# Patient Record
Sex: FEMALE | Race: WHITE | Employment: FULL TIME | URBAN - METROPOLITAN AREA
[De-identification: names, ages, dates, MRNs, and addresses within clinical notes are randomized per-mention and may not be internally consistent; named-entity substitution may affect disease eponyms.]

---

## 2017-04-27 ENCOUNTER — HOSPITAL ENCOUNTER (OUTPATIENT)
Dept: MAMMOGRAPHY | Age: 42
Discharge: HOME | End: 2017-04-27
Attending: OBSTETRICS & GYNECOLOGY
Payer: COMMERCIAL

## 2017-04-27 DIAGNOSIS — Z12.31 VISIT FOR SCREENING MAMMOGRAM: ICD-10-CM

## 2017-04-27 PROCEDURE — 77067 SCR MAMMO BI INCL CAD: CPT

## 2018-06-07 ENCOUNTER — HOSPITAL ENCOUNTER (OUTPATIENT)
Dept: MAMMOGRAPHY | Age: 43
Discharge: HOME | End: 2018-06-07
Attending: OBSTETRICS & GYNECOLOGY
Payer: COMMERCIAL

## 2018-06-07 DIAGNOSIS — Z01.419 WELL WOMAN EXAM: ICD-10-CM

## 2018-06-07 DIAGNOSIS — Z12.39 SCREENING BREAST EXAMINATION: ICD-10-CM

## 2018-06-07 PROCEDURE — 77067 SCR MAMMO BI INCL CAD: CPT

## 2018-10-10 PROBLEM — Z13.0 SCREENING FOR IRON DEFICIENCY ANEMIA: Status: ACTIVE | Noted: 2018-10-10

## 2018-10-10 PROBLEM — N92.1 MENOMETRORRHAGIA: Status: ACTIVE | Noted: 2018-10-10

## 2018-10-15 ENCOUNTER — HOSPITAL ENCOUNTER (OUTPATIENT)
Dept: SURGERY | Age: 43
Discharge: HOME | End: 2018-10-15

## 2018-10-15 VITALS — HEIGHT: 64 IN | BODY MASS INDEX: 24.59 KG/M2 | WEIGHT: 144 LBS

## 2018-10-15 NOTE — PERIOP NOTES
Patient verified name and . Order for consent not found in EHR- unable to determine if it matches case posting; patient verifies procedure. Type 1B surgery, phone assessment complete. Orders not received. Labs per surgeon: no orders in EMR at this time  Labs per anesthesia protocol: hgb collected 10/9/18- result in EMR and within anesthesia guidelines    Patient answered medical/surgical history questions at their best of ability. All prior to admission medications documented in [facility]. Patient instructed to take the following medications the day of surgery according to anesthesia guidelines with a small sip of water: none. Hold all vitamins 7 days prior to surgery and NSAIDS 5 days prior to surgery. Medications to be held- vitamins (except Rx iron). Patient instructed on the following:  Arrive at A Entrance, time of arrival to be called the day before by 1700  NPO after midnight including gum, mints, and ice chips  Responsible adult must drive patient to the hospital, stay during surgery, and patient will need supervision 24 hours after anesthesia  Use antibacterial soap in shower the night before surgery and on the morning of surgery  Leave all valuables (money and jewelry) at home but bring insurance card and ID on DOS  Do not wear make-up, nail polish, lotions, cologne, perfumes, powders, or oil on skin. Patient teach back successful and patient demonstrates knowledge of instruction.

## 2018-10-17 PROBLEM — Z01.818 PREOP EXAMINATION: Status: ACTIVE | Noted: 2018-10-17

## 2018-10-17 RX ORDER — SODIUM CHLORIDE 0.9 % (FLUSH) 0.9 %
5-10 SYRINGE (ML) INJECTION AS NEEDED
Status: CANCELLED | OUTPATIENT
Start: 2018-10-17

## 2018-10-17 RX ORDER — SODIUM CHLORIDE 0.9 % (FLUSH) 0.9 %
5-10 SYRINGE (ML) INJECTION EVERY 8 HOURS
Status: CANCELLED | OUTPATIENT
Start: 2018-10-17

## 2018-10-17 NOTE — H&P
History and Physical      Geno Gardner:   Physician:  Rosie Aguilar. Alt, DO    This 37 y.o. female presents today for pre-operative evaluation. History: This 37 y.o.  patient has history of  AUB, Endo hyperplasia, anemia. Unable to do endo biopsy in office. Us suggestive of hyperplasia. Previous treatment includes: iron and aygestin. OB History      Para Term  AB Living    3 2     1      SAB TAB Ectopic Molar Multiple Live Births                         Past Medical History:   Diagnosis Date    Anxiety     Heart palpitations     pt states possibly r/t to anxiety and anemia    Menorrhagia     Rib fracture 2014    Seasonal allergic reaction         has a past surgical history that includes hx hernia repair (); hx breast biopsy (Right); hx wisdom teeth extraction; and HERNIA VENTRAL REPAIR WITH MESH (N/A, 2014). Current Outpatient Medications   Medication Sig Dispense    miSOPROStol (CYTOTEC) 200 mcg tablet 1 tab orally night before surgery and 1 tab vaginally 2 Tab    iron-folate em5-P-T83-zinc-dss (FERIVA 21-7 TABLET) 75 mg iron- 1 mg-175 mg tab Take 1 Tab by mouth daily. Indications: Vitamin Deficiency 30 Tab    BIOTIN, BULK, by Does Not Apply route.  multivitamin (ONE A DAY) tablet Take 1 tablet by mouth daily.  norethindrone acetate (AYGESTIN) 5 mg tablet Take 2 Tabs by mouth daily. 60 Tab    tranexamic acid (LYSTEDA) 650 mg tab tablet Take 2 Tabs by mouth three (3) times daily. (Patient not taking: Reported on 10/15/2018) 30 Tab     No current facility-administered medications for this visit. No Known Allergies. reports that she quit smoking about 13 years ago. She quit after 14.00 years of use. she has never used smokeless tobacco. She reports that she drinks about 3.6 oz of alcohol per week. She reports that she does not use drugs.     family history includes Alcohol abuse in her father; Breast Cancer (age of onset: [de-identified]) in her paternal aunt; Diabetes in her paternal grandmother; Heart Disease in her paternal grandmother; Hypertension in her paternal grandmother; Kidney Disease in her father. Physical Exam:    Vitals:    10/17/18 1619   BP: 114/72   Weight: 142 lb (64.4 kg)   Height: 5' 4\" (1.626 m)       Patient without distress. Heart: Regular rate and rhythm   Lung: clear to auscultation throughout lung fields, no wheezes, no rales, no rhonchi and normal respiratory effort  Abdomen: soft, nontender  Lower Extremities:  - Edema No    Findings/Diagnosis:   Pre-Op Evaluation done today. abnormal uterine bleeding and acute blood loss anemia     Surgery to be Performed:  hysteroscopy with D&C  Surgery Date:  10/19/18 @ 11:00am  Surgery Place:  [facility]  Surgery Duration:  0.5 hour  Surgery Type:  Assistant Surgeon:  n/a    The risks of surgery were discussed in detail, including anesthesia, hemorrhage, blood clots, infection, uterine perforation and damage to other organs such as bowel. Time away from work and physical restrictions discussed. Would like her to use vaginal and oral cytotec to soften cervix. Pt understands & states she wants to proceed w/surgery.

## 2018-10-18 ENCOUNTER — ANESTHESIA EVENT (OUTPATIENT)
Dept: SURGERY | Age: 43
End: 2018-10-18
Payer: COMMERCIAL

## 2018-10-18 RX ORDER — SODIUM CHLORIDE 0.9 % (FLUSH) 0.9 %
5-10 SYRINGE (ML) INJECTION EVERY 8 HOURS
Status: CANCELLED | OUTPATIENT
Start: 2018-10-18

## 2018-10-18 RX ORDER — SODIUM CHLORIDE 9 MG/ML
25 INJECTION, SOLUTION INTRAVENOUS CONTINUOUS
Status: CANCELLED | OUTPATIENT
Start: 2018-10-18 | End: 2018-10-19

## 2018-10-18 RX ORDER — SODIUM CHLORIDE 0.9 % (FLUSH) 0.9 %
5-10 SYRINGE (ML) INJECTION AS NEEDED
Status: CANCELLED | OUTPATIENT
Start: 2018-10-18

## 2018-10-19 ENCOUNTER — ANESTHESIA (OUTPATIENT)
Dept: SURGERY | Age: 43
End: 2018-10-19
Payer: COMMERCIAL

## 2018-10-19 ENCOUNTER — HOSPITAL ENCOUNTER (OUTPATIENT)
Age: 43
Setting detail: OUTPATIENT SURGERY
Discharge: HOME | End: 2018-10-19
Attending: OBSTETRICS & GYNECOLOGY | Admitting: OBSTETRICS & GYNECOLOGY
Payer: COMMERCIAL

## 2018-10-19 VITALS
WEIGHT: 143.38 LBS | HEART RATE: 58 BPM | DIASTOLIC BLOOD PRESSURE: 59 MMHG | OXYGEN SATURATION: 96 % | TEMPERATURE: 99.1 F | BODY MASS INDEX: 24.48 KG/M2 | HEIGHT: 64 IN | SYSTOLIC BLOOD PRESSURE: 101 MMHG | RESPIRATION RATE: 18 BRPM

## 2018-10-19 DIAGNOSIS — Z98.890 S/P DILATION AND CURETTAGE: Primary | ICD-10-CM

## 2018-10-19 LAB — HCG UR QL: NEGATIVE

## 2018-10-19 PROCEDURE — 74011250636 HC RX REV CODE- 250/636

## 2018-10-19 PROCEDURE — 76060000032 HC ANESTHESIA 0.5 TO 1 HR: Performed by: OBSTETRICS & GYNECOLOGY

## 2018-10-19 PROCEDURE — 88305 TISSUE EXAM BY PATHOLOGIST: CPT

## 2018-10-19 PROCEDURE — 77030018836 HC SOL IRR NACL ICUM -A: Performed by: OBSTETRICS & GYNECOLOGY

## 2018-10-19 PROCEDURE — 77030020782 HC GWN BAIR PAWS FLX 3M -B: Performed by: ANESTHESIOLOGY

## 2018-10-19 PROCEDURE — 77030020143 HC AIRWY LARYN INTUB CGAS -A: Performed by: ANESTHESIOLOGY

## 2018-10-19 PROCEDURE — 76210000006 HC OR PH I REC 0.5 TO 1 HR: Performed by: OBSTETRICS & GYNECOLOGY

## 2018-10-19 PROCEDURE — 74011000250 HC RX REV CODE- 250

## 2018-10-19 PROCEDURE — 81025 URINE PREGNANCY TEST: CPT

## 2018-10-19 PROCEDURE — 77030005537 HC CATH URETH BARD -A: Performed by: OBSTETRICS & GYNECOLOGY

## 2018-10-19 PROCEDURE — 74011250637 HC RX REV CODE- 250/637: Performed by: ANESTHESIOLOGY

## 2018-10-19 PROCEDURE — 77030019927 HC TBNG IRR CYSTO BAXT -A: Performed by: OBSTETRICS & GYNECOLOGY

## 2018-10-19 PROCEDURE — 74011250636 HC RX REV CODE- 250/636: Performed by: ANESTHESIOLOGY

## 2018-10-19 PROCEDURE — 76010000138 HC OR TIME 0.5 TO 1 HR: Performed by: OBSTETRICS & GYNECOLOGY

## 2018-10-19 PROCEDURE — 76210000020 HC REC RM PH II FIRST 0.5 HR: Performed by: OBSTETRICS & GYNECOLOGY

## 2018-10-19 RX ORDER — HYDROMORPHONE HYDROCHLORIDE 2 MG/ML
0.5 INJECTION, SOLUTION INTRAMUSCULAR; INTRAVENOUS; SUBCUTANEOUS
Status: DISCONTINUED | OUTPATIENT
Start: 2018-10-19 | End: 2018-10-19 | Stop reason: HOSPADM

## 2018-10-19 RX ORDER — FENTANYL CITRATE 50 UG/ML
100 INJECTION, SOLUTION INTRAMUSCULAR; INTRAVENOUS ONCE
Status: DISCONTINUED | OUTPATIENT
Start: 2018-10-19 | End: 2018-10-19 | Stop reason: HOSPADM

## 2018-10-19 RX ORDER — IBUPROFEN 800 MG/1
800 TABLET ORAL
Qty: 35 TAB | Refills: 1 | Status: SHIPPED | OUTPATIENT
Start: 2018-10-19 | End: 2018-10-31 | Stop reason: ALTCHOICE

## 2018-10-19 RX ORDER — KETOROLAC TROMETHAMINE 30 MG/ML
INJECTION, SOLUTION INTRAMUSCULAR; INTRAVENOUS AS NEEDED
Status: DISCONTINUED | OUTPATIENT
Start: 2018-10-19 | End: 2018-10-19 | Stop reason: HOSPADM

## 2018-10-19 RX ORDER — MIDAZOLAM HYDROCHLORIDE 1 MG/ML
2 INJECTION, SOLUTION INTRAMUSCULAR; INTRAVENOUS
Status: DISCONTINUED | OUTPATIENT
Start: 2018-10-19 | End: 2018-10-19 | Stop reason: SDUPTHER

## 2018-10-19 RX ORDER — DEXAMETHASONE SODIUM PHOSPHATE 4 MG/ML
INJECTION, SOLUTION INTRA-ARTICULAR; INTRALESIONAL; INTRAMUSCULAR; INTRAVENOUS; SOFT TISSUE AS NEEDED
Status: DISCONTINUED | OUTPATIENT
Start: 2018-10-19 | End: 2018-10-19 | Stop reason: HOSPADM

## 2018-10-19 RX ORDER — SODIUM CHLORIDE 0.9 % (FLUSH) 0.9 %
5-10 SYRINGE (ML) INJECTION AS NEEDED
Status: DISCONTINUED | OUTPATIENT
Start: 2018-10-19 | End: 2018-10-19 | Stop reason: HOSPADM

## 2018-10-19 RX ORDER — SODIUM CHLORIDE, SODIUM LACTATE, POTASSIUM CHLORIDE, CALCIUM CHLORIDE 600; 310; 30; 20 MG/100ML; MG/100ML; MG/100ML; MG/100ML
100 INJECTION, SOLUTION INTRAVENOUS CONTINUOUS
Status: DISCONTINUED | OUTPATIENT
Start: 2018-10-19 | End: 2018-10-19 | Stop reason: HOSPADM

## 2018-10-19 RX ORDER — LIDOCAINE HYDROCHLORIDE 10 MG/ML
0.1 INJECTION INFILTRATION; PERINEURAL AS NEEDED
Status: DISCONTINUED | OUTPATIENT
Start: 2018-10-19 | End: 2018-10-19 | Stop reason: HOSPADM

## 2018-10-19 RX ORDER — HYDROCODONE BITARTRATE AND ACETAMINOPHEN 7.5; 325 MG/1; MG/1
1 TABLET ORAL AS NEEDED
Status: DISCONTINUED | OUTPATIENT
Start: 2018-10-19 | End: 2018-10-19 | Stop reason: HOSPADM

## 2018-10-19 RX ORDER — HYDROCODONE BITARTRATE AND ACETAMINOPHEN 5; 325 MG/1; MG/1
1 TABLET ORAL
Qty: 10 TAB | Refills: 0 | Status: SHIPPED | OUTPATIENT
Start: 2018-10-19 | End: 2018-10-31 | Stop reason: ALTCHOICE

## 2018-10-19 RX ORDER — PROPOFOL 10 MG/ML
INJECTION, EMULSION INTRAVENOUS AS NEEDED
Status: DISCONTINUED | OUTPATIENT
Start: 2018-10-19 | End: 2018-10-19 | Stop reason: HOSPADM

## 2018-10-19 RX ORDER — NALOXONE HYDROCHLORIDE 0.4 MG/ML
0.1 INJECTION, SOLUTION INTRAMUSCULAR; INTRAVENOUS; SUBCUTANEOUS AS NEEDED
Status: DISCONTINUED | OUTPATIENT
Start: 2018-10-19 | End: 2018-10-19 | Stop reason: HOSPADM

## 2018-10-19 RX ORDER — EPHEDRINE SULFATE 50 MG/ML
INJECTION, SOLUTION INTRAVENOUS AS NEEDED
Status: DISCONTINUED | OUTPATIENT
Start: 2018-10-19 | End: 2018-10-19 | Stop reason: HOSPADM

## 2018-10-19 RX ORDER — ONDANSETRON 2 MG/ML
INJECTION INTRAMUSCULAR; INTRAVENOUS AS NEEDED
Status: DISCONTINUED | OUTPATIENT
Start: 2018-10-19 | End: 2018-10-19 | Stop reason: HOSPADM

## 2018-10-19 RX ORDER — MIDAZOLAM HYDROCHLORIDE 1 MG/ML
2 INJECTION, SOLUTION INTRAMUSCULAR; INTRAVENOUS
Status: DISCONTINUED | OUTPATIENT
Start: 2018-10-19 | End: 2018-10-19 | Stop reason: HOSPADM

## 2018-10-19 RX ORDER — FENTANYL CITRATE 50 UG/ML
INJECTION, SOLUTION INTRAMUSCULAR; INTRAVENOUS AS NEEDED
Status: DISCONTINUED | OUTPATIENT
Start: 2018-10-19 | End: 2018-10-19 | Stop reason: HOSPADM

## 2018-10-19 RX ORDER — LIDOCAINE HYDROCHLORIDE 20 MG/ML
INJECTION, SOLUTION EPIDURAL; INFILTRATION; INTRACAUDAL; PERINEURAL AS NEEDED
Status: DISCONTINUED | OUTPATIENT
Start: 2018-10-19 | End: 2018-10-19 | Stop reason: HOSPADM

## 2018-10-19 RX ORDER — OXYCODONE HYDROCHLORIDE 5 MG/1
5 TABLET ORAL
Status: DISCONTINUED | OUTPATIENT
Start: 2018-10-19 | End: 2018-10-19 | Stop reason: HOSPADM

## 2018-10-19 RX ORDER — FAMOTIDINE 20 MG/1
20 TABLET, FILM COATED ORAL ONCE
Status: COMPLETED | OUTPATIENT
Start: 2018-10-19 | End: 2018-10-19

## 2018-10-19 RX ADMIN — EPHEDRINE SULFATE 10 MG: 50 INJECTION, SOLUTION INTRAVENOUS at 11:15

## 2018-10-19 RX ADMIN — KETOROLAC TROMETHAMINE 30 MG: 30 INJECTION, SOLUTION INTRAMUSCULAR; INTRAVENOUS at 11:27

## 2018-10-19 RX ADMIN — LIDOCAINE HYDROCHLORIDE 0.1 ML: 10 INJECTION, SOLUTION INFILTRATION; PERINEURAL at 09:48

## 2018-10-19 RX ADMIN — LIDOCAINE HYDROCHLORIDE 60 MG: 20 INJECTION, SOLUTION EPIDURAL; INFILTRATION; INTRACAUDAL; PERINEURAL at 11:04

## 2018-10-19 RX ADMIN — DEXAMETHASONE SODIUM PHOSPHATE 4 MG: 4 INJECTION, SOLUTION INTRA-ARTICULAR; INTRALESIONAL; INTRAMUSCULAR; INTRAVENOUS; SOFT TISSUE at 11:10

## 2018-10-19 RX ADMIN — FENTANYL CITRATE 100 MCG: 50 INJECTION, SOLUTION INTRAMUSCULAR; INTRAVENOUS at 10:59

## 2018-10-19 RX ADMIN — SODIUM CHLORIDE, SODIUM LACTATE, POTASSIUM CHLORIDE, AND CALCIUM CHLORIDE 100 ML/HR: 600; 310; 30; 20 INJECTION, SOLUTION INTRAVENOUS at 09:48

## 2018-10-19 RX ADMIN — FAMOTIDINE 20 MG: 20 TABLET ORAL at 09:40

## 2018-10-19 RX ADMIN — PROPOFOL 200 MG: 10 INJECTION, EMULSION INTRAVENOUS at 11:04

## 2018-10-19 RX ADMIN — ONDANSETRON 4 MG: 2 INJECTION INTRAMUSCULAR; INTRAVENOUS at 11:13

## 2018-10-19 NOTE — OP NOTES
Hysteroscopy Operative Report    Date of Surgery: 10/19/2018      Preoperative Diagnosis: Abnormal uterine bleeding (AUB) [N93.9]  Endometrial hyperplasia [N85.00]     Postoperative Diagnosis: Abnormal uterine bleeding (AUB) [N93.9]     Surgeon:  Patel Calloway DO    Anesthesia: General     Procedure: Procedure(s):  DILATATION AND CURETTAGE HYSTEROSCOPY     Estimated Blood Loss:     50cc    Intravenous fluids were administered, lactated Ringer's 850 ml's. Specimen:   ID Type Source Tests Collected by Time Destination   1 : endometrial currettage Preservative   Giancarlo Muir DO 10/19/2018 1119 Pathology        Findings:  Anteverted uterus sounded to 11cm with lush polypoid type tissue seen. Procedure Detail:  Patient was taken to the operating room where she was administered geta. Once her anesthesia was found to be adequate, she was then prepped and draped in normal sterile fashion and placed in low rio stirrups. Next heavy weighted speculum was placed in posterior vaginal vault and a right angle retractor was used to visualize the cervix. All instruments were removed for the vagina and bimanual exam indicated an anteverted uterus. Heavy weighted speculum replaced. The anterior lip of the cervix was grasped with single-toothed tenaculum. Next uterus was dilated to accomodate a 5mm hysteroscope. Next hysteroscope was advanced to fundus with findings as noted above. Polyp forceps and sharp currettage was then performed of the cavity. Hysteroscope advanced two more times with currettage performed  with thin shaggy endometrium  noted. At this point the procedure was over and single toothed tenaculum was removed from anterior lip of the cervix with excellent hemostasis. Patient tolerated the procedure well.      Signed By: Coral Jimenez DO     October 19, 2018

## 2018-10-19 NOTE — ANESTHESIA PREPROCEDURE EVALUATION
Anesthetic History               Review of Systems / Medical History  Patient summary reviewed    Pulmonary                   Neuro/Psych              Cardiovascular            Dysrhythmias (palpitations)       Exercise tolerance: >4 METS     GI/Hepatic/Renal                Endo/Other             Other Findings              Physical Exam    Airway  Mallampati: II  TM Distance: > 6 cm  Neck ROM: normal range of motion   Mouth opening: Normal     Cardiovascular  Regular rate and rhythm,  S1 and S2 normal,  no murmur, click, rub, or gallop             Dental  No notable dental hx       Pulmonary  Breath sounds clear to auscultation               Abdominal         Other Findings            Anesthetic Plan    ASA: 2  Anesthesia type: general            Anesthetic plan and risks discussed with: Patient

## 2018-10-31 PROBLEM — Z09 POSTOP CHECK: Status: ACTIVE | Noted: 2018-10-31

## 2018-12-18 NOTE — H&P
History and Physical      Jose Luis Sender:   Physician:  Malini Kidd. Alt, DO    This 37 y.o. female presents today for pre-operative evaluation. History: This 37 y.o.  patient has history of  Complex endometrial hyperplasia with atypia. Last hg was 10.3 in October. She is taking oral iron therapy. OB History      Para Term  AB Living    3 2     1 1    SAB TAB Ectopic Molar Multiple Live Births              2          Past Medical History:   Diagnosis Date    Anxiety     Heart palpitations     pt states possibly r/t to anxiety and anemia    Menorrhagia     Rib fracture 2014    Seasonal allergic reaction         has a past surgical history that includes hx hernia repair (); hx breast biopsy (Right); hx wisdom teeth extraction; hx dilation and curettage (10/19/2018); DILATATION AND CURETTAGE HYSTEROSCOPY (N/A, 10/19/2018); and HERNIA VENTRAL REPAIR WITH MESH (N/A, 2014). Current Outpatient Medications   Medication Sig Dispense    biotin 1 mg tab Take  by mouth.  iron-folate gn4-O-A24-zinc-dss (FERIVA 21-7 TABLET) 75 mg iron- 1 mg-175 mg tab Take 1 Tab by mouth daily. Indications: Vitamin Deficiency 30 Tab    multivitamin (ONE A DAY) tablet Take 1 tablet by mouth daily. No current facility-administered medications for this visit. No Known Allergies. reports that she quit smoking about 14 years ago. She quit after 14.00 years of use. she has never used smokeless tobacco. She reports that she drinks about 3.6 oz of alcohol per week. She reports that she does not use drugs. family history includes Alcohol abuse in her father; Breast Cancer (age of onset: [de-identified]) in her paternal aunt; Diabetes in her paternal grandmother; Heart Disease in her paternal grandmother; Hypertension in her paternal grandmother; Kidney Disease in her father.       Physical Exam:    Vitals:    18 0846   BP: 114/68   Weight: 148 lb (67.1 kg)       Patient without distress. Heart: Regular rate and rhythm   Lung: clear to auscultation throughout lung fields, no wheezes, no rales, no rhonchi and normal respiratory effort  Abdomen: soft, nontender  Lower Extremities:  - Edema No    Findings/Diagnosis:   Pre-Op Evaluation done today. Complex atypical hyperplasia      Surgery to be Performed:  tlh with bilateral salpingectomy   Surgery Date:    Surgery Place:  [facility]  Surgery Duration:  1.5 hour  Surgery Type:  Assistant Surgeon: The risks of surgery were discussed in detail, including anesthesia, hemorrhage, blood clots, infection, damage to other organs such as bowel, bladder, ureters. Also the possible need for catheter use at home after surgery. Time away from work and physical restrictions discussed. Also the possible need for catheter use at home after surgery. Pt understands that complications aren't anticipated but that if they should occur than corrective procedures would be performed as indicated including, but not limited to, need for: transfusion, antibiotics, and additional surgical procedures including modification/extension of incision (possible vertical incision), colostomy, reimplantation of ureters, need for prolonged catheter drainage should urologic injury occur & other procedures as indicated. Unanticipated reactions to anesthesia as well as the small possibility of death were all discussed. All of the patient's questions/concerns were answered. She was encouraged to call me if she has additional questions/concerns prior to surgery. Pt understands & states she wants to proceed w/surgery.

## 2018-12-20 ENCOUNTER — HOSPITAL ENCOUNTER (OUTPATIENT)
Dept: SURGERY | Age: 43
Discharge: HOME | End: 2018-12-20
Attending: OBSTETRICS & GYNECOLOGY
Payer: COMMERCIAL

## 2018-12-20 VITALS
SYSTOLIC BLOOD PRESSURE: 105 MMHG | BODY MASS INDEX: 25.51 KG/M2 | OXYGEN SATURATION: 98 % | HEART RATE: 61 BPM | TEMPERATURE: 97 F | HEIGHT: 64 IN | RESPIRATION RATE: 14 BRPM | DIASTOLIC BLOOD PRESSURE: 65 MMHG | WEIGHT: 149.44 LBS

## 2018-12-20 LAB — HGB BLD-MCNC: 11 G/DL (ref 11.7–15.4)

## 2018-12-20 PROCEDURE — 85018 HEMOGLOBIN: CPT

## 2018-12-20 NOTE — PERIOP NOTES
Hgb result within anesthesia guidelines. Chart filed.     Recent Results (from the past 12 hour(s))   HEMOGLOBIN    Collection Time: 12/20/18  3:50 PM   Result Value Ref Range    HGB 11.0 (L) 11.7 - 15.4 g/dL

## 2018-12-20 NOTE — PERIOP NOTES
Patient verified name and     Order for consent found in EHR and matches case posting; patient verified. Type 2 surgery, PAT assessment complete. Labs per surgeon: none  Labs per anesthesia protocol: hgb needed- results pending  EKG: not needed    Hibiclens and instructions given per hospital policy. Patient provided with and instructed on educational handouts including Guide to Surgery, Pain Management, Hand Hygiene, Blood Transfusion Education, and [facility] Brochure. Patient answered medical/surgical history questions at their best of ability. All prior to admission medications documented in [facility]. Original medication prescription bottles not visualized during patient appointment. Patient instructed to hold all vitamins 7 days prior to surgery and NSAIDS 5 days prior to surgery, patient verbalized understanding. Medications to be held: vitamins except iron supplement. Patient instructed to continue previous medications as prescribed prior to surgery and to take the following medications the day of surgery according to anesthesia guidelines with a small sip of water: none. Patient teach back successful and patient demonstrates knowledge of instructions.

## 2019-01-02 ENCOUNTER — ANESTHESIA EVENT (OUTPATIENT)
Dept: SURGERY | Age: 44
End: 2019-01-02
Payer: COMMERCIAL

## 2019-01-03 ENCOUNTER — ANESTHESIA (OUTPATIENT)
Dept: SURGERY | Age: 44
End: 2019-01-03
Payer: COMMERCIAL

## 2019-01-03 ENCOUNTER — HOSPITAL ENCOUNTER (OUTPATIENT)
Age: 44
Setting detail: OBSERVATION
Discharge: HOME | End: 2019-01-04
Attending: OBSTETRICS & GYNECOLOGY | Admitting: OBSTETRICS & GYNECOLOGY
Payer: COMMERCIAL

## 2019-01-03 DIAGNOSIS — Z90.710 S/P HYSTERECTOMY: Primary | ICD-10-CM

## 2019-01-03 PROBLEM — N85.02 COMPLEX ATYPICAL ENDOMETRIAL HYPERPLASIA: Status: ACTIVE | Noted: 2019-01-03

## 2019-01-03 LAB
ABO + RH BLD: NORMAL
BLOOD GROUP ANTIBODIES SERPL: NORMAL
HCG UR QL: NEGATIVE
SPECIMEN EXP DATE BLD: NORMAL

## 2019-01-03 PROCEDURE — 81025 URINE PREGNANCY TEST: CPT

## 2019-01-03 PROCEDURE — 86900 BLOOD TYPING SEROLOGIC ABO: CPT

## 2019-01-03 PROCEDURE — 77030018836 HC SOL IRR NACL ICUM -A: Performed by: OBSTETRICS & GYNECOLOGY

## 2019-01-03 PROCEDURE — 77030035236 HC SUT PDS STRATFX BARB J&J -B: Performed by: OBSTETRICS & GYNECOLOGY

## 2019-01-03 PROCEDURE — 77030008477 HC STYL SATN SLP COVD -A: Performed by: ANESTHESIOLOGY

## 2019-01-03 PROCEDURE — 77030020782 HC GWN BAIR PAWS FLX 3M -B: Performed by: ANESTHESIOLOGY

## 2019-01-03 PROCEDURE — 74011250636 HC RX REV CODE- 250/636: Performed by: OBSTETRICS & GYNECOLOGY

## 2019-01-03 PROCEDURE — 88307 TISSUE EXAM BY PATHOLOGIST: CPT

## 2019-01-03 PROCEDURE — 74011250636 HC RX REV CODE- 250/636

## 2019-01-03 PROCEDURE — 77030035044 HC TRCR ENDOSC VRSPRT BLDLSS COVD -C: Performed by: OBSTETRICS & GYNECOLOGY

## 2019-01-03 PROCEDURE — 77030031139 HC SUT VCRL2 J&J -A: Performed by: OBSTETRICS & GYNECOLOGY

## 2019-01-03 PROCEDURE — 77030035051: Performed by: OBSTETRICS & GYNECOLOGY

## 2019-01-03 PROCEDURE — 77030039425 HC BLD LARYNG TRULITE DISP TELE -A: Performed by: ANESTHESIOLOGY

## 2019-01-03 PROCEDURE — 77030035029 HC NDL INSUF VERES DISP COVD -B: Performed by: OBSTETRICS & GYNECOLOGY

## 2019-01-03 PROCEDURE — 74011250636 HC RX REV CODE- 250/636: Performed by: ANESTHESIOLOGY

## 2019-01-03 PROCEDURE — 94760 N-INVAS EAR/PLS OXIMETRY 1: CPT

## 2019-01-03 PROCEDURE — 74011250637 HC RX REV CODE- 250/637: Performed by: OBSTETRICS & GYNECOLOGY

## 2019-01-03 PROCEDURE — 99218 HC RM OBSERVATION: CPT

## 2019-01-03 PROCEDURE — 74011000250 HC RX REV CODE- 250

## 2019-01-03 PROCEDURE — 76210000016 HC OR PH I REC 1 TO 1.5 HR: Performed by: OBSTETRICS & GYNECOLOGY

## 2019-01-03 PROCEDURE — 77030008703 HC TU ET UNCUF COVD -A: Performed by: ANESTHESIOLOGY

## 2019-01-03 PROCEDURE — 76060000034 HC ANESTHESIA 1.5 TO 2 HR: Performed by: OBSTETRICS & GYNECOLOGY

## 2019-01-03 PROCEDURE — 77030008756 HC TU IRR SUC STRY -B: Performed by: OBSTETRICS & GYNECOLOGY

## 2019-01-03 PROCEDURE — 77030034849: Performed by: OBSTETRICS & GYNECOLOGY

## 2019-01-03 PROCEDURE — 77030035048 HC TRCR ENDOSC OPTCL COVD -B: Performed by: OBSTETRICS & GYNECOLOGY

## 2019-01-03 PROCEDURE — 77030039266 HC ADH SKN EXOFIN S2SG -A: Performed by: OBSTETRICS & GYNECOLOGY

## 2019-01-03 PROCEDURE — 77030018778 HC MANIP UTER VCAR CNMD -B: Performed by: OBSTETRICS & GYNECOLOGY

## 2019-01-03 PROCEDURE — 76010000162 HC OR TIME 1.5 TO 2 HR INTENSV-TIER 1: Performed by: OBSTETRICS & GYNECOLOGY

## 2019-01-03 PROCEDURE — 77030032490 HC SLV COMPR SCD KNE COVD -B: Performed by: OBSTETRICS & GYNECOLOGY

## 2019-01-03 PROCEDURE — 77030008522 HC TBNG INSUF LAPRO STRY -B: Performed by: OBSTETRICS & GYNECOLOGY

## 2019-01-03 PROCEDURE — 77030000038 HC TIP SCIS LAPSCP SURI -B: Performed by: OBSTETRICS & GYNECOLOGY

## 2019-01-03 RX ORDER — SODIUM CHLORIDE, SODIUM LACTATE, POTASSIUM CHLORIDE, CALCIUM CHLORIDE 600; 310; 30; 20 MG/100ML; MG/100ML; MG/100ML; MG/100ML
75 INJECTION, SOLUTION INTRAVENOUS CONTINUOUS
Status: DISCONTINUED | OUTPATIENT
Start: 2019-01-03 | End: 2019-01-03 | Stop reason: HOSPADM

## 2019-01-03 RX ORDER — SODIUM CHLORIDE 0.9 % (FLUSH) 0.9 %
5-10 SYRINGE (ML) INJECTION AS NEEDED
Status: DISCONTINUED | OUTPATIENT
Start: 2019-01-03 | End: 2019-01-03 | Stop reason: HOSPADM

## 2019-01-03 RX ORDER — LIDOCAINE HYDROCHLORIDE 20 MG/ML
INJECTION, SOLUTION EPIDURAL; INFILTRATION; INTRACAUDAL; PERINEURAL AS NEEDED
Status: DISCONTINUED | OUTPATIENT
Start: 2019-01-03 | End: 2019-01-03 | Stop reason: HOSPADM

## 2019-01-03 RX ORDER — SODIUM CHLORIDE 0.9 % (FLUSH) 0.9 %
5-10 SYRINGE (ML) INJECTION AS NEEDED
Status: DISCONTINUED | OUTPATIENT
Start: 2019-01-03 | End: 2019-01-04 | Stop reason: HOSPADM

## 2019-01-03 RX ORDER — HYDROCODONE BITARTRATE AND ACETAMINOPHEN 7.5; 325 MG/1; MG/1
1 TABLET ORAL
Status: DISCONTINUED | OUTPATIENT
Start: 2019-01-03 | End: 2019-01-04 | Stop reason: HOSPADM

## 2019-01-03 RX ORDER — SODIUM CHLORIDE 0.9 % (FLUSH) 0.9 %
5-10 SYRINGE (ML) INJECTION EVERY 8 HOURS
Status: DISCONTINUED | OUTPATIENT
Start: 2019-01-03 | End: 2019-01-04 | Stop reason: HOSPADM

## 2019-01-03 RX ORDER — GLYCOPYRROLATE 0.2 MG/ML
INJECTION INTRAMUSCULAR; INTRAVENOUS AS NEEDED
Status: DISCONTINUED | OUTPATIENT
Start: 2019-01-03 | End: 2019-01-03 | Stop reason: HOSPADM

## 2019-01-03 RX ORDER — MIDAZOLAM HYDROCHLORIDE 1 MG/ML
2 INJECTION, SOLUTION INTRAMUSCULAR; INTRAVENOUS ONCE
Status: COMPLETED | OUTPATIENT
Start: 2019-01-03 | End: 2019-01-03

## 2019-01-03 RX ORDER — NEOSTIGMINE METHYLSULFATE 1 MG/ML
INJECTION INTRAVENOUS AS NEEDED
Status: DISCONTINUED | OUTPATIENT
Start: 2019-01-03 | End: 2019-01-03 | Stop reason: HOSPADM

## 2019-01-03 RX ORDER — HYDROMORPHONE HYDROCHLORIDE 2 MG/ML
0.5 INJECTION, SOLUTION INTRAMUSCULAR; INTRAVENOUS; SUBCUTANEOUS
Status: DISCONTINUED | OUTPATIENT
Start: 2019-01-03 | End: 2019-01-03 | Stop reason: HOSPADM

## 2019-01-03 RX ORDER — OXYCODONE HYDROCHLORIDE 5 MG/1
5 TABLET ORAL
Status: DISCONTINUED | OUTPATIENT
Start: 2019-01-03 | End: 2019-01-03 | Stop reason: HOSPADM

## 2019-01-03 RX ORDER — DEXAMETHASONE SODIUM PHOSPHATE 4 MG/ML
INJECTION, SOLUTION INTRA-ARTICULAR; INTRALESIONAL; INTRAMUSCULAR; INTRAVENOUS; SOFT TISSUE AS NEEDED
Status: DISCONTINUED | OUTPATIENT
Start: 2019-01-03 | End: 2019-01-03 | Stop reason: HOSPADM

## 2019-01-03 RX ORDER — ROCURONIUM BROMIDE 10 MG/ML
INJECTION, SOLUTION INTRAVENOUS AS NEEDED
Status: DISCONTINUED | OUTPATIENT
Start: 2019-01-03 | End: 2019-01-03 | Stop reason: HOSPADM

## 2019-01-03 RX ORDER — ONDANSETRON 4 MG/1
4 TABLET, ORALLY DISINTEGRATING ORAL
Status: DISCONTINUED | OUTPATIENT
Start: 2019-01-03 | End: 2019-01-04 | Stop reason: HOSPADM

## 2019-01-03 RX ORDER — LIDOCAINE HYDROCHLORIDE ANHYDROUS AND DEXTROSE MONOHYDRATE .4; 5 G/100ML; G/100ML
INJECTION, SOLUTION INTRAVENOUS
Status: DISCONTINUED | OUTPATIENT
Start: 2019-01-03 | End: 2019-01-03

## 2019-01-03 RX ORDER — KETOROLAC TROMETHAMINE 30 MG/ML
INJECTION, SOLUTION INTRAMUSCULAR; INTRAVENOUS AS NEEDED
Status: DISCONTINUED | OUTPATIENT
Start: 2019-01-03 | End: 2019-01-03 | Stop reason: HOSPADM

## 2019-01-03 RX ORDER — ZOLPIDEM TARTRATE 5 MG/1
5 TABLET ORAL
Status: DISCONTINUED | OUTPATIENT
Start: 2019-01-03 | End: 2019-01-04 | Stop reason: HOSPADM

## 2019-01-03 RX ORDER — ONDANSETRON 2 MG/ML
INJECTION INTRAMUSCULAR; INTRAVENOUS AS NEEDED
Status: DISCONTINUED | OUTPATIENT
Start: 2019-01-03 | End: 2019-01-03 | Stop reason: HOSPADM

## 2019-01-03 RX ORDER — SODIUM CHLORIDE, SODIUM LACTATE, POTASSIUM CHLORIDE, CALCIUM CHLORIDE 600; 310; 30; 20 MG/100ML; MG/100ML; MG/100ML; MG/100ML
50 INJECTION, SOLUTION INTRAVENOUS CONTINUOUS
Status: DISCONTINUED | OUTPATIENT
Start: 2019-01-03 | End: 2019-01-03 | Stop reason: HOSPADM

## 2019-01-03 RX ORDER — FENTANYL CITRATE 50 UG/ML
100 INJECTION, SOLUTION INTRAMUSCULAR; INTRAVENOUS ONCE
Status: DISCONTINUED | OUTPATIENT
Start: 2019-01-03 | End: 2019-01-03 | Stop reason: HOSPADM

## 2019-01-03 RX ORDER — KETOROLAC TROMETHAMINE 30 MG/ML
30 INJECTION, SOLUTION INTRAMUSCULAR; INTRAVENOUS EVERY 6 HOURS
Status: DISCONTINUED | OUTPATIENT
Start: 2019-01-03 | End: 2019-01-04 | Stop reason: HOSPADM

## 2019-01-03 RX ORDER — ACETAMINOPHEN 10 MG/ML
INJECTION, SOLUTION INTRAVENOUS AS NEEDED
Status: DISCONTINUED | OUTPATIENT
Start: 2019-01-03 | End: 2019-01-03 | Stop reason: HOSPADM

## 2019-01-03 RX ORDER — ALBUTEROL SULFATE 0.83 MG/ML
2.5 SOLUTION RESPIRATORY (INHALATION) AS NEEDED
Status: DISCONTINUED | OUTPATIENT
Start: 2019-01-03 | End: 2019-01-03 | Stop reason: HOSPADM

## 2019-01-03 RX ORDER — PROPOFOL 10 MG/ML
INJECTION, EMULSION INTRAVENOUS AS NEEDED
Status: DISCONTINUED | OUTPATIENT
Start: 2019-01-03 | End: 2019-01-03 | Stop reason: HOSPADM

## 2019-01-03 RX ORDER — LIDOCAINE HYDROCHLORIDE 10 MG/ML
0.1 INJECTION INFILTRATION; PERINEURAL AS NEEDED
Status: DISCONTINUED | OUTPATIENT
Start: 2019-01-03 | End: 2019-01-03 | Stop reason: HOSPADM

## 2019-01-03 RX ORDER — CEFAZOLIN SODIUM/WATER 2 G/20 ML
2 SYRINGE (ML) INTRAVENOUS ONCE
Status: COMPLETED | OUTPATIENT
Start: 2019-01-03 | End: 2019-01-03

## 2019-01-03 RX ORDER — HYDROMORPHONE HYDROCHLORIDE 2 MG/ML
1 INJECTION, SOLUTION INTRAMUSCULAR; INTRAVENOUS; SUBCUTANEOUS ONCE
Status: ACTIVE | OUTPATIENT
Start: 2019-01-03 | End: 2019-01-04

## 2019-01-03 RX ORDER — CELECOXIB 200 MG/1
200 CAPSULE ORAL
Status: DISCONTINUED | OUTPATIENT
Start: 2019-01-03 | End: 2019-01-03

## 2019-01-03 RX ORDER — MIDAZOLAM HYDROCHLORIDE 1 MG/ML
2 INJECTION, SOLUTION INTRAMUSCULAR; INTRAVENOUS
Status: DISCONTINUED | OUTPATIENT
Start: 2019-01-03 | End: 2019-01-03 | Stop reason: HOSPADM

## 2019-01-03 RX ORDER — LIDOCAINE HYDROCHLORIDE 10 MG/ML
INJECTION INFILTRATION; PERINEURAL AS NEEDED
Status: DISCONTINUED | OUTPATIENT
Start: 2019-01-03 | End: 2019-01-03 | Stop reason: HOSPADM

## 2019-01-03 RX ORDER — FENTANYL CITRATE 50 UG/ML
INJECTION, SOLUTION INTRAMUSCULAR; INTRAVENOUS AS NEEDED
Status: DISCONTINUED | OUTPATIENT
Start: 2019-01-03 | End: 2019-01-03 | Stop reason: HOSPADM

## 2019-01-03 RX ORDER — EPHEDRINE SULFATE 50 MG/ML
INJECTION, SOLUTION INTRAVENOUS AS NEEDED
Status: DISCONTINUED | OUTPATIENT
Start: 2019-01-03 | End: 2019-01-03 | Stop reason: HOSPADM

## 2019-01-03 RX ADMIN — LIDOCAINE HYDROCHLORIDE 1 ML: 10 INJECTION INFILTRATION; PERINEURAL at 10:00

## 2019-01-03 RX ADMIN — LIDOCAINE HYDROCHLORIDE 0.1 ML: 10 INJECTION, SOLUTION INFILTRATION; PERINEURAL at 08:03

## 2019-01-03 RX ADMIN — ONDANSETRON 4 MG: 2 INJECTION INTRAMUSCULAR; INTRAVENOUS at 10:25

## 2019-01-03 RX ADMIN — FENTANYL CITRATE 50 MCG: 50 INJECTION, SOLUTION INTRAMUSCULAR; INTRAVENOUS at 10:54

## 2019-01-03 RX ADMIN — LIDOCAINE HYDROCHLORIDE 2 ML: 10 INJECTION INFILTRATION; PERINEURAL at 09:15

## 2019-01-03 RX ADMIN — FENTANYL CITRATE 100 MCG: 50 INJECTION, SOLUTION INTRAMUSCULAR; INTRAVENOUS at 09:10

## 2019-01-03 RX ADMIN — EPHEDRINE SULFATE 10 MG: 50 INJECTION, SOLUTION INTRAVENOUS at 09:22

## 2019-01-03 RX ADMIN — SODIUM CHLORIDE, SODIUM LACTATE, POTASSIUM CHLORIDE, AND CALCIUM CHLORIDE: 600; 310; 30; 20 INJECTION, SOLUTION INTRAVENOUS at 10:26

## 2019-01-03 RX ADMIN — PROPOFOL 160 MG: 10 INJECTION, EMULSION INTRAVENOUS at 09:10

## 2019-01-03 RX ADMIN — LIDOCAINE HYDROCHLORIDE 1 ML: 10 INJECTION INFILTRATION; PERINEURAL at 09:35

## 2019-01-03 RX ADMIN — MIDAZOLAM 2 MG: 1 INJECTION INTRAMUSCULAR; INTRAVENOUS at 08:12

## 2019-01-03 RX ADMIN — KETOROLAC TROMETHAMINE 30 MG: 30 INJECTION, SOLUTION INTRAMUSCULAR at 17:46

## 2019-01-03 RX ADMIN — GLYCOPYRROLATE 0.2 MG: 0.2 INJECTION INTRAMUSCULAR; INTRAVENOUS at 10:44

## 2019-01-03 RX ADMIN — LIDOCAINE HYDROCHLORIDE 70 MG: 20 INJECTION, SOLUTION EPIDURAL; INFILTRATION; INTRACAUDAL; PERINEURAL at 09:10

## 2019-01-03 RX ADMIN — Medication 10 ML: at 17:47

## 2019-01-03 RX ADMIN — HYDROMORPHONE HYDROCHLORIDE 0.5 MG: 2 INJECTION, SOLUTION INTRAMUSCULAR; INTRAVENOUS; SUBCUTANEOUS at 13:08

## 2019-01-03 RX ADMIN — SODIUM CHLORIDE, SODIUM LACTATE, POTASSIUM CHLORIDE, AND CALCIUM CHLORIDE 75 ML/HR: 600; 310; 30; 20 INJECTION, SOLUTION INTRAVENOUS at 08:03

## 2019-01-03 RX ADMIN — Medication 2 G: at 09:16

## 2019-01-03 RX ADMIN — HYDROCODONE BITARTRATE AND ACETAMINOPHEN 1 TABLET: 7.5; 325 TABLET ORAL at 22:26

## 2019-01-03 RX ADMIN — LIDOCAINE HYDROCHLORIDE 1 ML: 10 INJECTION INFILTRATION; PERINEURAL at 09:45

## 2019-01-03 RX ADMIN — FENTANYL CITRATE 50 MCG: 50 INJECTION, SOLUTION INTRAMUSCULAR; INTRAVENOUS at 10:50

## 2019-01-03 RX ADMIN — DEXAMETHASONE SODIUM PHOSPHATE 8 MG: 4 INJECTION, SOLUTION INTRA-ARTICULAR; INTRALESIONAL; INTRAMUSCULAR; INTRAVENOUS; SOFT TISSUE at 09:20

## 2019-01-03 RX ADMIN — Medication 10 ML: at 22:27

## 2019-01-03 RX ADMIN — NEOSTIGMINE METHYLSULFATE 3 MG: 1 INJECTION INTRAVENOUS at 10:44

## 2019-01-03 RX ADMIN — ACETAMINOPHEN 1000 MG: 10 INJECTION, SOLUTION INTRAVENOUS at 10:47

## 2019-01-03 RX ADMIN — LIDOCAINE HYDROCHLORIDE 1 ML: 10 INJECTION INFILTRATION; PERINEURAL at 10:16

## 2019-01-03 RX ADMIN — ROCURONIUM BROMIDE 50 MG: 10 INJECTION, SOLUTION INTRAVENOUS at 09:11

## 2019-01-03 RX ADMIN — KETOROLAC TROMETHAMINE 30 MG: 30 INJECTION, SOLUTION INTRAMUSCULAR; INTRAVENOUS at 10:41

## 2019-01-03 NOTE — ANESTHESIA POSTPROCEDURE EVALUATION
Procedure(s): HYSTERECTOMY TOTAL LAPAROSCOPIC BILATERAL SALPINGECTOMY.     Anesthesia Post Evaluation      Multimodal analgesia: multimodal analgesia used between 6 hours prior to anesthesia start to PACU discharge  Patient location during evaluation: PACU  Patient participation: complete - patient participated  Level of consciousness: responsive to verbal stimuli  Pain management: adequate  Airway patency: patent  Anesthetic complications: no  Cardiovascular status: acceptable  Respiratory status: acceptable, spontaneous ventilation and nonlabored ventilation  Hydration status: acceptable  Post anesthesia nausea and vomiting:  none      Visit Vitals  /63   Pulse 80   Temp 36.2 °C (97.2 °F)   Resp 16   Ht 5' 4\" (1.626 m)   Wt 67.6 kg (149 lb)   SpO2 100%   BMI 25.58 kg/m²

## 2019-01-03 NOTE — OP NOTES
Laparoscopic Hysterectomy Operative Report    Patient: Reza Garrett MRN: [id number]  SSN: [id number]    YOB: 1975  Age: 37 y.o. Sex: female      Date of Surgery: 1/3/2019     Preoperative Diagnosis: Complex atypical endometrial hyperplasia [N85.02]     Postoperative Diagnosis: Complex atypical endometrial hyperplasia [N85.02]     Surgeon(s) and Role:     * Catracho Calloway DO - Primary     * Chioma Melendrez MD - Assisting     Anesthesia: General     Procedure:  Procedure(s): HYSTERECTOMY TOTAL LAPAROSCOPIC BILATERAL SALPINGECTOMY, cystoscopy      Findings: 9 week uterus with 2 small anterior fibroids. Grossly normal tubes and ovaries. Hemorrhagic cyst on right. Normal liver edge and gallbladder. Appendix wnl. During cystoscopy, no evidence of suture defect and both ureteral jets seen. Estimated Blood Loss:    200cc     Drains: mcguire     Fluids:  1000cc    Urine Output:  50cc - clear yellow         Specimens:    ID Type Source Tests Collected by Time Destination   1 : uterus with bilateral tubes Fresh   Avril Ferreira DO 1/3/2019 1041 Pathology         DVT Prophylaxis: scds     Antibiotic Prophylaxis: ancef     Procedure in Detail:  Patient was taken to the operating room where she was administered geta. Once her anesthesia was found to be adequate, and appropiate time out occurred, she was prepped and draped in normal sterile fashion with arms tucked. Next difficult to pass the internal os with sound so  attention was turned to the umbilicus. A 5mm skin incision was made and Veress needle was inserted with clear aspiration of fluid. Next Co2 gas was connected with opening pressure noted to be -2 mmHg. Co2 gas was instilled until abdominal was insufflated. Patient was placed in steep trendelenburg. Next a 10mm right port site was placed under direct visualization. Next 5mm port was placed in left lower quadrant under direct visualization.   Under direct visualization,  care was placed. Next survey was taken of the abdomen and pelvis with findings as noted above. Next attention was turned to left fallopian tube which was elevated and the tube was removed using the harmonic scalpel. Next, the round ligament and  uteroovarian were taken down with the harmonic scalpel. The harmonic was used to take down the cardinals and the uterine artery pedicle with excellent hemostasis. Next bladder flap was dissected. Next the right adnexa was taken down in similar fashion to left. At this time, the harmonic scalpel was used to dissect the vaginal cuff at the apex of the v-care cup. Next the uterus was pulled into the vaginal vault and the cuff was sutured using 0-vicryl in figure of 8 fashion with excellent hemostasis on the angles. stratafix was used to close the midportion of the cuff in running fashion. Next the pelvic was irrigated and excellent hemostasis was insured. Attention was turned to cystoscopy with findings noted above. Attention was then redirected to the pelvis. Small amount of oozing was seen from posterior cuff on the right. Harmonic was used to seal vessel on edge of cuff as well as 1 interrupted suture placed on right edge. Hemorraghic cyst on right was ruptured with good hemostasis. Pressure was reduced with good hemostasis. All ports were removed under direct visualization. The skin site over the right lower quadrant site was sutured with 4-0 vicryl. Dermabond was placed over all sites. Pt tolerated the procedure well and was taken to the PACU in stable fashion.       Signed By: Edna Duff DO     January 3, 2019

## 2019-01-03 NOTE — PROGRESS NOTES
Admitted to the unit, no acute distress, alert, oriented X 4, abdomen soft tender, puncture sites X 3( umbilicus, right and left quadrant) with derma bond, no drainage, pain level 3, neurovascular checks WDL, instructed on use of incentive spirometer, returned demonstration,  and mother at bedside.

## 2019-01-03 NOTE — PERIOP NOTES
TRANSFER - OUT REPORT:    Verbal report given to [facility] RN on Willow Thomas  being transferred to [facility] for routine progression of care       Report consisted of patients Situation, Background, Assessment and   Recommendations(SBAR). Information from the following report(s) SBAR was reviewed with the receiving nurse. Lines:   Peripheral IV 01/03/19 Left Wrist (Active)   Site Assessment Clean, dry, & intact 1/3/2019 10:58 AM   Phlebitis Assessment 0 1/3/2019 10:58 AM   Infiltration Assessment 0 1/3/2019 10:58 AM   Dressing Status Clean, dry, & intact 1/3/2019 10:58 AM   Dressing Type Tape;Transparent 1/3/2019 10:58 AM   Hub Color/Line Status Green 1/3/2019 10:58 AM        Opportunity for questions and clarification was provided.

## 2019-01-03 NOTE — PROGRESS NOTES
Chart screened by  for discharge planning. No needs identified at this time. Please consult  if any new issues arise.     Cathryn Sky, [address] [facility]    [address] [facility]  [email or web address]

## 2019-01-03 NOTE — PROGRESS NOTES
TRANSFER - IN REPORT:    Verbal report received from Sri Evangelista, [facility] on Jose Luis Sender  being received from PACU for routine progression of care      Report consisted of patients Situation, Background, Assessment and   Recommendations(SBAR). Information from the following report(s) Kardex was reviewed with the receiving nurse. Opportunity for questions and clarification was provided. Assessment completed upon patients arrival to unit and care assumed.

## 2019-01-04 VITALS
BODY MASS INDEX: 25.44 KG/M2 | DIASTOLIC BLOOD PRESSURE: 53 MMHG | HEART RATE: 59 BPM | TEMPERATURE: 97.7 F | OXYGEN SATURATION: 99 % | HEIGHT: 64 IN | SYSTOLIC BLOOD PRESSURE: 99 MMHG | WEIGHT: 149 LBS | RESPIRATION RATE: 16 BRPM

## 2019-01-04 LAB
HCT VFR BLD AUTO: 30.9 % (ref 35.8–46.3)
HGB BLD-MCNC: 10.2 G/DL (ref 11.7–15.4)

## 2019-01-04 PROCEDURE — 99218 HC RM OBSERVATION: CPT

## 2019-01-04 PROCEDURE — 36415 COLL VENOUS BLD VENIPUNCTURE: CPT

## 2019-01-04 PROCEDURE — 85018 HEMOGLOBIN: CPT

## 2019-01-04 PROCEDURE — 74011250636 HC RX REV CODE- 250/636: Performed by: OBSTETRICS & GYNECOLOGY

## 2019-01-04 RX ORDER — IBUPROFEN 800 MG/1
800 TABLET ORAL
Qty: 35 TAB | Refills: 1 | Status: SHIPPED | OUTPATIENT
Start: 2019-01-04 | End: 2019-02-19 | Stop reason: ALTCHOICE

## 2019-01-04 RX ORDER — HYDROCODONE BITARTRATE AND ACETAMINOPHEN 7.5; 325 MG/1; MG/1
1 TABLET ORAL
Qty: 30 TAB | Refills: 0 | Status: SHIPPED | OUTPATIENT
Start: 2019-01-04 | End: 2019-02-19 | Stop reason: ALTCHOICE

## 2019-01-04 RX ADMIN — KETOROLAC TROMETHAMINE 30 MG: 30 INJECTION, SOLUTION INTRAMUSCULAR at 06:47

## 2019-01-04 RX ADMIN — Medication 10 ML: at 06:50

## 2019-01-04 RX ADMIN — KETOROLAC TROMETHAMINE 30 MG: 30 INJECTION, SOLUTION INTRAMUSCULAR at 00:49

## 2019-01-04 NOTE — DISCHARGE INSTRUCTIONS
* Follow-up Care/Patient Instructions: Activity: No sex, douching, or tampons for 6 weeks or as directed by your physician. No heavy lifting for 6 weeks. No driving while taking pain medication. Diet: Resume pre-hospital diet  Wound Care: As directed      DISCHARGE SUMMARY from Nurse    PATIENT INSTRUCTIONS:    After general anesthesia or intravenous sedation, for 24 hours or while taking prescription Narcotics:  · Limit your activities  · Do not drive and operate hazardous machinery  · Do not make important personal or business decisions  · Do  not drink alcoholic beverages  · If you have not urinated within 8 hours after discharge, please contact your surgeon on call. Report the following to your surgeon:  · Excessive pain, swelling, redness or odor of or around the surgical area  · Temperature over 100.5  · Nausea and vomiting lasting longer than 4 hours or if unable to take medications  · Any signs of decreased circulation or nerve impairment to extremity: change in color, persistent  numbness, tingling, coldness or increase pain  · Any questions    What to do at Home:  Recommended activity: Activity as tolerated, see above instructions     If you experience any of the following symptoms severe pain, persistent nausea/vomiting, fever or other s/s of infection, vaginal bleeding (>1pad/hour), please follow up with Dr Orlin Braun ASA. *  Please give a list of your current medications to your Primary Care Provider. *  Please update this list whenever your medications are discontinued, doses are      changed, or new medications (including over-the-counter products) are added. *  Please carry medication information at all times in case of emergency situations. These are general instructions for a healthy lifestyle:    No smoking/ No tobacco products/ Avoid exposure to second hand smoke  Surgeon General's Warning:  Quitting smoking now greatly reduces serious risk to your health.     Obesity, smoking, and sedentary lifestyle greatly increases your risk for illness    A healthy diet, regular physical exercise & weight monitoring are important for maintaining a healthy lifestyle    You may be retaining fluid if you have a history of heart failure or if you experience any of the following symptoms:  Weight gain of 3 pounds or more overnight or 5 pounds in a week, increased swelling in our hands or feet or shortness of breath while lying flat in bed. Please call your doctor as soon as you notice any of these symptoms; do not wait until your next office visit. Recognize signs and symptoms of STROKE:    F-face looks uneven    A-arms unable to move or move unevenly    S-speech slurred or non-existent    T-time-call 911 as soon as signs and symptoms begin-DO NOT go       Back to bed or wait to see if you get better-TIME IS BRAIN. Warning Signs of HEART ATTACK     Call 911 if you have these symptoms:   Chest discomfort. Most heart attacks involve discomfort in the center of the chest that lasts more than a few minutes, or that goes away and comes back. It can feel like uncomfortable pressure, squeezing, fullness, or pain.  Discomfort in other areas of the upper body. Symptoms can include pain or discomfort in one or both arms, the back, neck, jaw, or stomach.  Shortness of breath with or without chest discomfort.  Other signs may include breaking out in a cold sweat, nausea, or lightheadedness. Don't wait more than five minutes to call 911 - MINUTES MATTER! Fast action can save your life. Calling 911 is almost always the fastest way to get lifesaving treatment. Emergency Medical Services staff can begin treatment when they arrive -- up to an hour sooner than if someone gets to the hospital by car. The discharge information has been reviewed with the patient. The patient verbalized understanding.   Discharge medications reviewed with the patient and appropriate educational materials and side effects teaching were provided. ___________________________________________________________________________________________________________________________________     Laparoscopic Hysterectomy: What to Expect at [facility]    A hysterectomy is surgery to take out the uterus. In some cases, the ovaries and fallopian tubes also are taken out at the same time. You can expect to feel better and stronger each day, but you may need pain medicine for a week or two. You may get tired easily or have less energy than usual. The tiredness may last for several weeks after surgery. You will probably notice that your belly is swollen and puffy. This is common. The swelling will take several weeks to go down. You may take about 4 to 6 weeks to fully recover. It is important to avoid lifting while you are recovering so that you can heal.  This care sheet gives you a general idea about how long it will take for you to recover. But each person recovers at a different pace. Follow the steps below to get better as quickly as possible. How can you care for yourself at home? Activity    · Rest when you feel tired.     · Be active. Walking is a good choice.     · Allow the area to heal. Don't move quickly or lift anything heavy until you are feeling better.     · You may shower 24 to 48 hours after surgery, if your doctor okays it. Pat the incision dry. Do not take a bath for the first 2 weeks, or until your doctor tells you it is okay.     · Ask your doctor when it is okay for you to have sex. Diet    · You can eat your normal diet. If your stomach is upset, try bland, low-fat foods like plain rice, broiled chicken, toast, and yogurt.     · If your bowel movements are not regular right after surgery, try to avoid constipation and straining. Drink plenty of water. Your doctor may suggest fiber, a stool softener, or a mild laxative. Medicines    · Your doctor will tell you if and when you can restart your medicines.  He or she will also give you instructions about taking any new medicines.     · If you take blood thinners, such as warfarin (Coumadin), clopidogrel (Plavix), or aspirin, be sure to talk to your doctor. He or she will tell you if and when to start taking those medicines again. Make sure that you understand exactly what your doctor wants you to do.     · Be safe with medicines. Read and follow all instructions on the label. ? If the doctor gave you a prescription medicine for pain, take it as prescribed. ? If you are not taking a prescription pain medicine, ask your doctor if you can take an over-the-counter medicine.     · If your doctor prescribed antibiotics, take them as directed. Do not stop taking them just because you feel better. You need to take the full course of antibiotics. Incision care    · You may have stitches over the cuts (incisions) the doctor made in your belly.     · If you have strips of tape on the cut (incision) the doctor made, leave the tape on for a week or until it falls off.     · Wash the area daily with warm, soapy water, and pat it dry. Don't use hydrogen peroxide or alcohol. They can slow healing.     · You may cover the area with a gauze bandage if it oozes fluid or rubs against clothing.     · Change the bandage every day. Other instructions    · You may have some light vaginal bleeding. Wear sanitary pads if needed. Do not douche or use tampons.     · Don't have sex until the doctor says it is okay. Follow-up care is a key part of your treatment and safety. Be sure to make and go to all appointments, and call your doctor if you are having problems. It's also a good idea to know your test results and keep a list of the medicines you take. When should you call for help? Call 911 anytime you think you may need emergency care.  For example, call if:    · You passed out (lost consciousness).     · You have chest pain, are short of breath, or cough up blood.    Call your doctor now or seek immediate medical care if:    · You have pain that does not get better after you take pain medicine.     · You cannot pass stoolsl or gas.     · You have vaginal discharge that has increased in amount or smells bad.     · You are sick to your stomach or cannot drink fluids.     · You have loose stitches, or your incision comes open.     · Bright red blood has soaked through the bandage over your incision.     · You have signs of infection, such as:  ? Increased pain, swelling, warmth, or redness. ? Red streaks leading from the incision. ? Pus draining from the incision. ? A fever.     · You have bright red vaginal bleeding that soaks one or more pads in an hour, or you have large clots.     · You have signs of a blood clot in your leg (called a deep vein thrombosis), such as:  ? Pain in your calf, back of the knee, thigh, or groin. ? Redness and swelling in your leg or groin.    Watch closely for changes in your health, and be sure to contact your doctor if you have any problems. Where can you learn more? Go to http://micah-nick.info/. Enter Q131 in the search box to learn more about \"Laparoscopic Hysterectomy: What to Expect at Home. \"  Current as of: May 15, 2018  Content Version: 11.8  © 5806-3149 [employer], [employer]. Care instructions adapted under license by [employer] (which disclaims liability or warranty for this information). If you have questions about a medical condition or this instruction, always ask your healthcare professional. [place] [address] any warranty or liability for your use of this information.

## 2019-01-04 NOTE — PROGRESS NOTES
Pt resting in bed and is alert and oriented x 4. She denies pain and is on room air. RR even and unlabored. 3 PS with dermabond c/d/i. Call light in reach and pt instructed to call for assistance if needed. Will monitor.

## 2019-01-04 NOTE — DISCHARGE SUMMARY
Discharge Summary     Name: Evelio Tracy MRN: [id number]  SSN: [id number]    YOB: 1975  Age: 37 y.o. Sex: female      Allergies: Patient has no known allergies. Admit Date: 1/3/2019    Discharge Date: 1/4/2019      Admitting Physician: Froylan Calloway DO     * Admission Diagnoses: complex atypical hyperplasia     * Discharge Diagnoses:   Hospital Problems as of 1/4/2019 Date Reviewed: 1/3/2019          Codes Class Noted - Resolved POA    * (Principal) Complex atypical endometrial hyperplasia ICD-10-CM: N85.02  ICD-9-CM: 621.33  1/3/2019 - Present Yes               * Procedures: Total Laparoscopic Hysterectomy with Bilateral Salpingectomy    * Discharge Condition: [facility] Hospital Course: Normal hospital course for this procedure. Significant Diagnostic Studies:   Recent Results (from the past 24 hour(s))   HGB & HCT    Collection Time: 01/04/19  5:51 AM   Result Value Ref Range    HGB 10.2 (L) 11.7 - 15.4 g/dL    HCT 30.9 (L) 35.8 - 46.3 %       * Disposition: Home    Discharge Medications:   Current Discharge Medication List      START taking these medications    Details   HYDROcodone-acetaminophen (NORCO) 7.5-325 mg per tablet Take 1 Tab by mouth every six (6) hours as needed. Max Daily Amount: 4 Tabs. Qty: 30 Tab, Refills: 0    Associated Diagnoses: S/P hysterectomy      ibuprofen (MOTRIN) 800 mg tablet Take 1 Tab by mouth every eight (8) hours as needed for Pain. Qty: 35 Tab, Refills: 1         CONTINUE these medications which have NOT CHANGED    Details   biotin 1 mg tab Take  by mouth.      multivitamin (ONE A DAY) tablet Take 1 tablet by mouth daily. STOP taking these medications       iron-folate [id number] (FERIVA 21-7 TABLET) 75 mg iron- 1 mg-175 mg tab Comments:   Reason for Stopping:                * Follow-up Care/Patient Instructions: Activity: No sex, douching, or tampons for 6 weeks or as directed by your physician. No heavy lifting for 6 weeks.  No driving while taking pain medication.   Diet: Resume pre-hospital diet  Wound Care: As directed    Follow-up Information     Follow up With Specialties Details Why Contact Info    Robbin Hernandez NP [address]  [phone]             Signed By:  Jimmy Cartagena DO     January 4, 2019

## 2019-01-04 NOTE — PROGRESS NOTES
Initial visit was made, emotional support and a spiritual presence were provided. Patient is being discharged today.     [employer]

## 2019-01-04 NOTE — PROGRESS NOTES
Shift assessment complete. A&OX4. Lungs clear on auscultation. Respirations present. Even and unlabored. Apical HR is regular. Abdomen is soft and tender on palpation at 3 sx sites. No s/s of distress. Zero c/o pain at this time. Call light within reach. Encouraged patient to call for assistance. Patient verbalizes understanding. See Doc Flowsheet for assessment details. Patient resting in bed. Pt up ad kasey to bathroom and voiding without difficulty.

## 2019-01-04 NOTE — PROGRESS NOTES
OBG/GYN Progress Note    Patient doing well post-op day1 from Procedure(s): HYSTERECTOMY TOTAL LAPAROSCOPIC BILATERAL SALPINGECTOMY without significant complaints. Pain controlled on current medication. Voiding without difficulty. Patient is passing Flatus. Vitals:  Blood pressure 99/53, pulse (!) 59, temperature 97.7 °F (36.5 °C), resp. rate 16, height 5' 4\" (1.626 m), weight 149 lb (67.6 kg), last menstrual period 2018, SpO2 99 %, not currently breastfeeding. Temp (24hrs), Av.9 °F (36.6 °C), Min:97.2 °F (36.2 °C), Max:98.6 °F (37 °C)        Exam:  Patient without distress. Heart rrr   Lungs cta b&s                Abdomen soft,  nontender. Port sites intact. Incision dry and clean without erythema. Lower extremities are negative for swelling, cords or tenderness. Labs:   Recent Results (from the past 24 hour(s))   HGB & HCT    Collection Time: 19  5:51 AM   Result Value Ref Range    HGB 10.2 (L) 11.7 - 15.4 g/dL    HCT 30.9 (L) 35.8 - 46.3 %       Assessment and Plan:  Patient appears to be having uncomplicated post Procedure(s): HYSTERECTOMY TOTAL LAPAROSCOPIC BILATERAL SALPINGECTOMY course. Continue routine post-op care. Will discharge home.

## 2019-01-04 NOTE — PROGRESS NOTES
Lying supine in bed no acute distress, pain level 4, passing flatus, tolerated regular diet, without any drainage on deshawn pad.

## 2019-03-11 ENCOUNTER — HOSPITAL ENCOUNTER (OUTPATIENT)
Dept: LAB | Age: 44
Discharge: HOME | End: 2019-03-11

## 2019-03-11 PROCEDURE — 88305 TISSUE EXAM BY PATHOLOGIST: CPT

## 2019-09-23 PROBLEM — Z13.0 SCREENING FOR IRON DEFICIENCY ANEMIA: Status: RESOLVED | Noted: 2018-10-10 | Resolved: 2019-09-23

## 2019-09-23 PROBLEM — Z01.818 PREOP EXAMINATION: Status: RESOLVED | Noted: 2018-10-17 | Resolved: 2019-09-23

## 2020-12-01 ENCOUNTER — TRANSCRIBE ORDER (OUTPATIENT)
Dept: SCHEDULING | Age: 45
End: 2020-12-01

## 2020-12-09 ENCOUNTER — HOSPITAL ENCOUNTER (OUTPATIENT)
Dept: MAMMOGRAPHY | Age: 45
Discharge: HOME | End: 2020-12-09
Attending: OBSTETRICS & GYNECOLOGY
Payer: COMMERCIAL

## 2020-12-09 DIAGNOSIS — N63.25 BREAST LUMP ON LEFT SIDE AT 9 O'CLOCK POSITION: ICD-10-CM

## 2020-12-09 DIAGNOSIS — N63.20 BREAST MASS, LEFT: ICD-10-CM

## 2020-12-09 PROCEDURE — 76642 ULTRASOUND BREAST LIMITED: CPT

## 2020-12-09 PROCEDURE — 77066 DX MAMMO INCL CAD BI: CPT

## 2020-12-09 NOTE — PROGRESS NOTES
Thank goodness cystic change was seen on mammogram. So negative. You do have very dense breast tissue. We can order a breast mri in 6 months if your insurance will cover this.

## 2021-08-16 PROBLEM — R00.1 SINUS BRADYCARDIA: Status: ACTIVE | Noted: 2021-08-16

## 2021-08-16 PROBLEM — I45.10 INCOMPLETE RIGHT BUNDLE BRANCH BLOCK: Status: ACTIVE | Noted: 2021-08-16

## 2021-08-16 PROBLEM — I47.1 PSVT (PAROXYSMAL SUPRAVENTRICULAR TACHYCARDIA) (HCC): Status: ACTIVE | Noted: 2021-08-16

## 2021-11-10 ENCOUNTER — TRANSCRIBE ORDER (OUTPATIENT)
Dept: SCHEDULING | Age: 46
End: 2021-11-10

## 2021-11-10 DIAGNOSIS — Z12.31 SCREENING MAMMOGRAM FOR HIGH-RISK PATIENT: Primary | ICD-10-CM

## 2021-12-14 ENCOUNTER — HOSPITAL ENCOUNTER (OUTPATIENT)
Dept: MAMMOGRAPHY | Age: 46
Discharge: HOME | End: 2021-12-14
Attending: OBSTETRICS & GYNECOLOGY
Payer: COMMERCIAL

## 2021-12-14 DIAGNOSIS — Z12.31 SCREENING MAMMOGRAM FOR HIGH-RISK PATIENT: ICD-10-CM

## 2021-12-14 PROCEDURE — 77063 BREAST TOMOSYNTHESIS BI: CPT

## 2022-03-18 PROBLEM — R00.1 SINUS BRADYCARDIA: Status: ACTIVE | Noted: 2021-08-16

## 2022-03-19 PROBLEM — I45.10 INCOMPLETE RIGHT BUNDLE BRANCH BLOCK: Status: ACTIVE | Noted: 2021-08-16

## 2022-03-19 PROBLEM — N92.1 MENOMETRORRHAGIA: Status: ACTIVE | Noted: 2018-10-10

## 2022-03-19 PROBLEM — N85.02 COMPLEX ATYPICAL ENDOMETRIAL HYPERPLASIA: Status: ACTIVE | Noted: 2019-01-03

## 2022-03-19 PROBLEM — I47.1 PSVT (PAROXYSMAL SUPRAVENTRICULAR TACHYCARDIA) (HCC): Status: ACTIVE | Noted: 2021-08-16

## 2022-03-19 PROBLEM — Z09 POSTOP CHECK: Status: ACTIVE | Noted: 2018-10-31

## 2022-07-26 ENCOUNTER — OFFICE VISIT (OUTPATIENT)
Dept: FAMILY MEDICINE CLINIC | Facility: CLINIC | Age: 47
End: 2022-07-26
Payer: COMMERCIAL

## 2022-07-26 VITALS
DIASTOLIC BLOOD PRESSURE: 78 MMHG | HEART RATE: 46 BPM | WEIGHT: 149 LBS | BODY MASS INDEX: 25.44 KG/M2 | TEMPERATURE: 98 F | HEIGHT: 64 IN | SYSTOLIC BLOOD PRESSURE: 131 MMHG

## 2022-07-26 DIAGNOSIS — M25.512 ACUTE PAIN OF LEFT SHOULDER: Primary | ICD-10-CM

## 2022-07-26 PROCEDURE — 99214 OFFICE O/P EST MOD 30 MIN: CPT | Performed by: NURSE PRACTITIONER

## 2022-07-26 SDOH — ECONOMIC STABILITY: FOOD INSECURITY: WITHIN THE PAST 12 MONTHS, THE FOOD YOU BOUGHT JUST DIDN'T LAST AND YOU DIDN'T HAVE MONEY TO GET MORE.: NEVER TRUE

## 2022-07-26 SDOH — ECONOMIC STABILITY: FOOD INSECURITY: WITHIN THE PAST 12 MONTHS, YOU WORRIED THAT YOUR FOOD WOULD RUN OUT BEFORE YOU GOT MONEY TO BUY MORE.: NEVER TRUE

## 2022-07-26 ASSESSMENT — PATIENT HEALTH QUESTIONNAIRE - PHQ9
SUM OF ALL RESPONSES TO PHQ9 QUESTIONS 1 & 2: 0
SUM OF ALL RESPONSES TO PHQ QUESTIONS 1-9: 0
SUM OF ALL RESPONSES TO PHQ QUESTIONS 1-9: 0
2. FEELING DOWN, DEPRESSED OR HOPELESS: 0
SUM OF ALL RESPONSES TO PHQ QUESTIONS 1-9: 0
1. LITTLE INTEREST OR PLEASURE IN DOING THINGS: 0
SUM OF ALL RESPONSES TO PHQ QUESTIONS 1-9: 0

## 2022-07-26 ASSESSMENT — SOCIAL DETERMINANTS OF HEALTH (SDOH): HOW HARD IS IT FOR YOU TO PAY FOR THE VERY BASICS LIKE FOOD, HOUSING, MEDICAL CARE, AND HEATING?: NOT HARD AT ALL

## 2022-07-26 NOTE — PROGRESS NOTES
Subjective:      Patient ID: Deepika Hugo is a 48 y.o. female[place] On [place], she fell over a tub in the hallway. Staci Speak forward on her knees and tried to grab but missed and fell on  outstretched left arm. Felt a pop in her left shoulder. Pain is still present Unable to reach in any direction. IS very painful to get dressed. Constantly aches and ROM is negatively impacted  HPI    Review of Systems   Musculoskeletal:         Pain limited motion of left shoulder     Objective:   Physical Exam  Vitals and nursing note reviewed. Constitutional:       Appearance: Normal appearance. She is normal weight. Cardiovascular:      Rate and Rhythm: Normal rate and regular rhythm. Pulses: Normal pulses. Heart sounds: Normal heart sounds. Musculoskeletal:      Comments: Rom of left shoulder limited with active and passive rom above level of shoulder in any direction pain in posterior upper arm shoulder no crepitus   Skin:     General: Skin is warm and dry. Neurological:      Mental Status: She is alert. Assessment:    /78 (Site: Right Upper Arm, Position: Sitting, Cuff Size: Medium Adult)   Pulse (!) 46   Temp 98 °F (36.7 °C) (Temporal)   Ht 5' 4\" (1.626 m)   Wt 149 lb (67.6 kg)   BMI 25.58 kg/m²    . Plan:      1. Acute pain of left shoulder  -     XR SHOULDER LEFT (MIN 2 VIEWS); Future     Xray continue aleve bid OTC ice do pendulum exercises to keep joint mobile.  Will likely need MRI ortho referral    USHA Daugherty NP

## 2022-07-27 ENCOUNTER — TELEPHONE (OUTPATIENT)
Dept: FAMILY MEDICINE CLINIC | Facility: CLINIC | Age: 47
End: 2022-07-27

## 2022-07-27 DIAGNOSIS — M25.512 ACUTE PAIN OF LEFT SHOULDER: ICD-10-CM

## 2022-07-27 NOTE — TELEPHONE ENCOUNTER
----- Message from USHA Kasper NP sent at 7/27/2022  8:56 AM EDT -----  Toro Dillon showed no shoulder issues but healed rib fractures were you aware of that issue in the past? Will refer to ortho for shoulder as we discussed

## 2022-08-03 ENCOUNTER — OFFICE VISIT (OUTPATIENT)
Dept: ORTHOPEDIC SURGERY | Age: 47
End: 2022-08-03
Payer: COMMERCIAL

## 2022-08-03 VITALS — HEIGHT: 64 IN | WEIGHT: 148 LBS | BODY MASS INDEX: 25.27 KG/M2

## 2022-08-03 DIAGNOSIS — S49.92XA INJURY OF LEFT SHOULDER, INITIAL ENCOUNTER: Primary | ICD-10-CM

## 2022-08-03 PROCEDURE — 99204 OFFICE O/P NEW MOD 45 MIN: CPT | Performed by: ORTHOPAEDIC SURGERY

## 2022-08-17 DIAGNOSIS — S49.92XA INJURY OF LEFT SHOULDER, INITIAL ENCOUNTER: ICD-10-CM

## 2022-08-24 ENCOUNTER — OFFICE VISIT (OUTPATIENT)
Dept: ORTHOPEDIC SURGERY | Age: 47
End: 2022-08-24
Payer: COMMERCIAL

## 2022-08-24 DIAGNOSIS — S49.92XA INJURY OF LEFT SHOULDER, INITIAL ENCOUNTER: Primary | ICD-10-CM

## 2022-08-24 PROCEDURE — 99214 OFFICE O/P EST MOD 30 MIN: CPT | Performed by: ORTHOPAEDIC SURGERY

## 2022-08-24 RX ORDER — MELOXICAM 7.5 MG/1
7.5 TABLET ORAL 2 TIMES DAILY PRN
Qty: 28 TABLET | Refills: 1 | Status: SHIPPED | OUTPATIENT
Start: 2022-08-24 | End: 2022-10-03

## 2022-08-24 NOTE — PROGRESS NOTES
Name: Deepika Hugo  YOB: 1975  Gender: female  MRN: [id number]    CC:   Chief Complaint   Patient presents with    Shoulder Pain     Left shoulder     Left shoulder pain    HPI:  This patient presents as a referral from Dr. Maria Dolores Gold with MRI results. Initial injury occurred on 22 when she was trying to catch herself on a chair and she felt a pop. She notes some posterior lateral pain. Has notes popping. Denies numbness and tingling. No instability. Patient enjoys gardening. Left hand dominant. No recent treatment but has had MRI completed. Most of the pain is posterior. No Known Allergies  Past Medical History:   Diagnosis Date    Anemia     on iron supplement    Anxiety     Heart palpitations     pt states possibly r/t to anxiety and anemia    Menorrhagia     Rib fracture 2014    Seasonal allergic reaction      Past Surgical History:   Procedure Laterality Date    BREAST BIOPSY Right     DILATION AND CURETTAGE OF UTERUS  10/19/2018    HERNIA REPAIR      ventral    HYSTERECTOMY (CERVIX STATUS UNKNOWN)  2019    with Bilateral Salpingectomy Still has both ovaries    WISDOM TOOTH EXTRACTION       Family History   Problem Relation Age of Onset    Alcohol Abuse Father     Kidney Disease Father     Hypertension Paternal Grandmother     Diabetes Paternal Grandmother     Breast Cancer Paternal Aunt [de-identified]    Heart Disease Paternal Grandmother      Social History     Socioeconomic History    Marital status:      Spouse name: Not on file    Number of children: Not on file    Years of education: Not on file    Highest education level: Not on file   Occupational History    Not on file   Tobacco Use    Smoking status: Former     Types: Cigarettes     Quit date: 2004     Years since quittin.7    Smokeless tobacco: Never   Substance and Sexual Activity    Alcohol use:  Yes     Alcohol/week: 6.0 standard drinks    Drug use: No    Sexual activity: Not on file Comment: Hysterectomy    Other Topics Concern    Not on file   Social History Narrative    Lives with  Greyson Stevenson  since  children from prior marriage  Children Manuela   [address]    Lost daughter age 8  after taking Miralax for constipation     Social Determinants of Health     Financial Resource Strain: Low Risk     Difficulty of Paying Living Expenses: Not hard at all   Food Insecurity: No Food Insecurity    Worried About Running Out of Food in the Last Year: Never true    Ran Out of Food in the Last Year: Never true   Transportation Needs: Not on file   Physical Activity: Not on file   Stress: Not on file   Social Connections: Not on file   Intimate Partner Violence: Not on file   Housing Stability: Not on file        No flowsheet data found. Review of Systems  Noncontributary   Pertinent positives and negatives are addressed with the patient, particularly those related to musculoskeletal concerns. Non-orthopaedic concerns were referred back to the primary care physician. PE:    GEN: General appearance is that of a healthy patient, alert and oriented, in no distress. WDWN. HEENT:  Normocephalic, atraumatic. Extraocular muscles are intact. Neck:  Supple. Heart:  Regular pulse exam on all extremities. Good color and warmth noted. Lungs:  Normal non-labored breathing with no obvious shortness of breath. Abdomen:  WNL's. Skin:  No signs of rash or bruising. Pysch: Answers questions appropriately, AO X 3. MSK:  Cervical spine: No tenderness. Normal active motion. Negative Spurling's maneuver. SHOULDER   Left (Involved) Right   Skin Intact Intact   Radial Pulses 2+ Symmetrical 2+ Symmetrical   Deformity None Normal   Myotomes Normal Normal   Dermatomes  Normal Normal    ROM Full Full   Strength Appropriate.   Pain most noted with internal rotation No weakness   Atrophy None noted None noted   Effusion/Swelling  None noted None noted   Palpation Minimally TTP at the shoulder posteriorly none at the biceps or AC No tenderness   Bicep Tendon Rupture  Negative Negative signs   Bear Hug, Belly Press Negative, negative Negative   Crossed Arm Adduction Test normal    Instability/Ant. Apprehension Test None noted, jerk and Desirae test are mildly positive None noted   Impingement minimal marley Villegas, KRISHNA Negative      MRI left shoulder from 8/16/22:  FINDINGS: Well distended glenohumeral joint by intrarticular contrast.    Normal size and contour of the humeral head. Normal configuration of the  glenoid. Normal thickness and contour of the cartilage of the glenohumeral  joint. No glenoid joint capsule strain or tear. Intact glenohumeral  ligaments. Normal thickness and signal of the rotator cuff muscles. Normal  configuration and signal of the rotator cuff without evidence for a partial  or full-thickness tear. No downfacing osteophytes of the AC joint or compromise of the superior  outlet. No contrast within the subacromial - subdeltoid bursa. Smooth undersurface of the rotator cuff. I favor mild widening of the superior sublabral sulcus which may indicate a  type 1 SLAP tear. IMPRESSION: Possible type 1 SLAP tear. MRI was reviewed. Rotator cuff looks good. Cartilage does not show any significant arthritic changes. Cannot rule out SLAP tear but do not see any definitive indications that this is definitely occurring other than some mild signal seen on coronal image 10. Assessment:     ICD-10-CM    1. Right shoulder pain, unspecified chronicity  M25.511           Plan: I had a long discussion with the patient regarding the natural history of the problem, as well as treatment options. Discussed her MRI is fairly clean. Her exam is consistent more with muscle strain or possible posterior labral problem. Would suggest formal therapy. Since she is doing well enough overall suggest short course of oral NSAID to help with inflammation.   She deferred an injection of cortisone. Treatment:     Recommend therapy to evaluate and treat current complaints and pathology. A home exercise program was also discussed with the patient. Patient prescribed with Non-steroidal anti-inflammatories after review of risks and benefits. We discussed additional activity modification to help reduce pain for initial conservative treatment. Return in about 6 weeks (around 10/5/2022). Thank you for the opportunity to see your patient. If you have any questions please give me a call.   Sincerely,  Vincent Hightower MD  08/24/22

## 2022-09-06 NOTE — THERAPY EVALUATION
Camden Moore  : 1975  Primary: Jaguar Johnston  Secondary:  [address] @ [address] [address]  Phone: [phone]  Fax: [phone] Plan Frequency: 2-3x per week for 4-8 weeks  Plan of Care/Certification Expiration Date: 22    PT Visit Info: Total # of Visits to Date: 1  Progress Note Counter: 1    OUTPATIENT PHYSICAL THERAPY:OP NOTE TYPE: Initial Assessment 2022               Episode  Appt Desk         Treatment Diagnosis:  Pain in Left Shoulder (M25.512)  Stiffness of Left Shoulder, Not elsewhere classified (M25.612)  * No diagnoses found *  Medical/Referring Diagnosis:  Unspecified injury of left shoulder and upper arm, initial encounter [A40.73JO]  Referring Physician:  Hussain Narvaez MD MD Orders:  PT Eval and Treat   Return MD Appt:  10/5/22  Date of Onset:  Onset Date: 22   Allergies:  Patient has no known allergies. Restrictions/Precautions:    No data recordedNo data recorded   Medications Last Reviewed:  2022     SUBJECTIVE   History of Injury/Illness (Reason for Referral):  Pt had a \"popping\" sensation when trying to stand from a chair. Pt noticed that since the initial injury the pt states that she gets posterior shoulder pain. Pt denies radicular s/s above and below the injury site. MARIAH was LUE shldr abduction with an posterior-anterior force 90/90 position   Patient Stated Goal(s):  \"would like to move my LUE shldr without pain\"  Initial:     5/10 Post Session:     4/10  Past Medical History/Comorbidities:   Ms. Ely Serrano  has a past medical history of Anemia, Anxiety, Heart palpitations, Menorrhagia, Rib fracture, and Seasonal allergic reaction. Ms. Ely Serrano  has a past surgical history that includes Sumpter tooth extraction; Hysterectomy (2019); Dilation and curettage of uterus (10/19/2018); Breast biopsy (Right); and hernia repair ().   Social History/Living Environment:   Lives With: Spouse  Type of Home: House   Prior Level of Function/Work/Activity:   Prior level of function: IndependentNo data recordedNo data recorded   Learning:   Does the patient/guardian have any barriers to learning?: No barriers   Fall Risk Scale: Total Score: 0  Arcos Fall Risk: Low (0-24)       OBJECTIVE     NATURE OF CONDITION Date: 9/7/22   Date:    Highest level of pain 8    Lowest level of pain 4    Aggravating factors Reaching, ER, lifting, pushing     Alleviating factors rest    Frequency of symptoms Everyday    Description of symptoms Aching, weak, burning      PALPATION Date: 9/7/22   Date:    Location of tenderness LUE Anterior-lateral        RANGE OF MOTION Date: 9/7/22   Date:      RIGHT LEFT RIGHT LEFT   Shoulder FLEX      Shoulder ABD *     Shoulder ER WNL 20     Shoulder IR WNL 75            Shoulder FLEX (PROM) WNL      Shoulder ABD (PROM) WNL          STRENGTH Date: 9/7/22   Date:      RIGHT LEFT RIGHT LEFT   Shoulder Flexion 4-/5 3-/5     Shoulder Abduction 4-/5 3-/5     Shoulder ER  4-/5 3-/5     Shoulder IR  4-/5 3-/5     Elbow Flexion 4-/5 3-/5     Elbow Extension 4-/5 3-/5       Painful arc sign (+)  Emeka Balzarine (+)  Elbow flexion test (-)   Obriens active compression test (+)   Vinny (-)   Infraspintaus (-)       NEUROLOGICAL SCREEN  Dermatomes: WNL BUE  Reflexes: 2+ BUE    JOINT PLAY  Anterior: restricted  Posterior: firm  Inferior: firm     SKIN INTEGRITY  Clean, dry, & intact    ASSESSMENT   Initial Assessment:  Pt is a 51 yo female who presents to the [facility] clinic with LUE shldr pain. Pt PMH is past hx of rib fractures and a hernia repair (2014). Pt has received Dx imaging via X-ray/MRI which were unremarkable. 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 Pt would benefit from skilled PT in order to address her impairments. Problem List: (Impacting functional limitations):     Body Structures, Functions, Activity Limitations Requiring Skilled Therapeutic Intervention: Decreased functional mobility ; Decreased ADL status; Decreased ROM; Decreased tolerance to work activity; Decreased strength; Increased pain   Therapy Prognosis:   Therapy Prognosis: Good   Assessment Complexity:   Medium Complexity  PLAN   Effective Dates: 9/7/22   TO Plan of Care/Certification Expiration Date: 11/06/22   Frequency/Duration: Plan Frequency: 2-3x per week for 4-8 weeks   Interventions Planned (Treatment may consist of any combination of the following):    Current Treatment Recommendations: Strengthening; ROM; Endurance training; Neuromuscular re-education; Manual Therapy - Soft Tissue Mobilization; Manual Therapy - Joint Manipulation; Pain management; Home exercise program; Safety education & training; Equipment evaluation, education, & procurement; Modalities; Positioning; Therapeutic activities   Goals: (Goals have been discussed and agreed upon with patient.)  Short-Term Functional Goals: Time Frame: 4 weeks   Pt will achieve a 20/55 QuickDash score in order to improve ADLs and function   Pt will achieve a VAS pain score of 5/10 in order to improve ADLS and function   Pt will improve all BLE ROM/Strength by 50% in order to improve ADLs and function   Discharge Goals: Time Frame: 8 weeks   Pt will achieve a 15/55 QuickDash score in order to improve ADLs and function   Pt will achieve a VAS pain score of 3/10 in order to improve ADLS and function   Pt will improve all BLE ROM/Strength by % in order to improve ADLs and function          Outcome Measure: Tool Used: Disabilities of the Arm, Shoulder and Hand (DASH) Questionnaire - Quick Version  Score:  Initial: 27/55  Most Recent: X/55 (Date: -- )   Interpretation of Score: The DASH is designed to measure the activities of daily living in person's with upper extremity dysfunction or pain. Each section is scored on a 1-5 scale, 5 representing the greatest disability.   The scores of each section are added together for a total score of 55. Medical Necessity:   > Skilled intervention continues to be required due to her listed impairments. Reason For Services/Other Comments:  > Patient continues to require skilled intervention due to her listed impairments . Total Duration:  Time In: 1430  Time Out: 1510    Regarding Simin Mariano's therapy, I certify that the treatment plan above will be carried out by a therapist or under their direction.   Thank you for this referral,  Leena Anne PT     Referring Physician Signature: Kannan Hampton MD _______________________________ Date _____________        Post Session Pain  Charge Capture   POC Link  Treatment Note Link  MD Guidelines  [facility]

## 2022-09-07 ENCOUNTER — HOSPITAL ENCOUNTER (OUTPATIENT)
Dept: PHYSICAL THERAPY | Age: 47
Setting detail: RECURRING SERIES
Discharge: HOME | End: 2022-09-10
Payer: COMMERCIAL

## 2022-09-07 PROCEDURE — 97016 VASOPNEUMATIC DEVICE THERAPY: CPT

## 2022-09-07 PROCEDURE — 97162 PT EVAL MOD COMPLEX 30 MIN: CPT

## 2022-09-07 PROCEDURE — 97110 THERAPEUTIC EXERCISES: CPT

## 2022-09-07 ASSESSMENT — PAIN SCALES - GENERAL: PAINLEVEL_OUTOF10: 5

## 2022-09-07 NOTE — PROGRESS NOTES
Ezequiel Matta  : 1975  Primary: [place]  Secondary:  [address] [address]  Phone: [phone]  Fax: [phone] Plan Frequency: 2-3x per week for 4-8 weeks    Plan of Care/Certification Expiration Date: 22      PT Visit Info: Total # of Visits to Date: 1  Progress Note Counter: 1      OUTPATIENT PHYSICAL THERAPY:OP NOTE TYPE: Treatment Note 2022       Episode  }Appt Desk              Treatment Diagnosis:  Pain in Left Shoulder (M25.512)  Stiffness of Left Shoulder, Not elsewhere classified (M25.612)  Medical/Referring Diagnosis:  Unspecified injury of left shoulder and upper arm, initial encounter [G23.29MP]  Referring Physician:  Chanelle Richard MD MD Orders:  PT Eval and Treat   Date of Onset:  Onset Date: 22     Allergies:   Patient has no known allergies. Restrictions/Precautions:  No data recordedNo data recorded   Interventions Planned (Treatment may consist of any combination of the following):    Current Treatment Recommendations: Strengthening; ROM; Endurance training; Neuromuscular re-education; Manual Therapy - Soft Tissue Mobilization; Manual Therapy - Joint Manipulation; Pain management; Home exercise program; Safety education & training; Equipment evaluation, education, & procurement; Modalities; Positioning; Therapeutic activities     Subjective Comments:  see eval  Initial:}    5/10Post Session:       4/10  Medications Last Reviewed:  2022  Updated Objective Findings:  See evaluation note from today  Treatment   THERAPEUTIC EXERCISE: (23 minutes):    Exercises per grid below to improve mobility, strength, balance and coordination. Required no visual, verbal, manual and tactile cues to promote proper body alignment, promote proper body posture, promote proper body mechanics and promote proper body breathing techniques.   Progressed resistance, range, repetitions and complexity of movement as indicated. THERAPEUTIC ACTIVITY: (  minutes): Therapeutic activities per grid below to improve mobility, strength, balance and coordination. Required no visual, verbal, manual and tactile cues to promote motor control of bilateral, lower extremity(s). NEUROMUSCULAR RE-EDUCATION: ( minutes):    Exercise/activities per grid below to improve balance, coordination, kinesthetic sense, posture and proprioception. Required no visual, verbal, manual and tactile cues to promote motor control of bilateral, lower extremity(s). MANUAL THERAPY: ( minutes):   Joint mobilization, Soft tissue mobilization, Manipulation and Manual lymphatic drainage was utilized and necessary because of the patient's restricted joint motion, painful spasm, loss of articular motion and restricted motion of soft tissue. MODALITIES: (15 minutes): Therapy in order to provide analgesia, relieve muscle spasm and reduce inflammation and edema. Vasopneumatic compression was performed on the LUE. The pt was positioned in sitting with the LUE supported. Skin was inspected pre and post modality no abnormalities seen. Date:  9/7/22   Date:   Date:     Activity/Exercise Parameters Parameters Parameters   Pt ed on biomechanics, PT POC, HEP  15 mins      PROM LUE all planes  8 mins                                        Treatment/Session Summary:    Treatment Assessment:  see eval  Communication/Consultation:  None today  Equipment provided today:  None  Recommendations/Intent for next treatment session: Next visit will focus on her listed impairments.     Total Treatment Billable Duration:  40 minutes  + eval   Time In: 1430  Time Out: 1510    Nancy Leyva, PT       Charge Capture  }Post Session Pain  MedBridge Portal  MD Guidelines  Scanned Media  Benefits  MyChart    Future Appointments   Date Time Provider Scott Duval   10/5/2022  8:30 AM DIONTE Tariq MD [facility] [address]

## 2022-09-13 ENCOUNTER — HOSPITAL ENCOUNTER (OUTPATIENT)
Dept: PHYSICAL THERAPY | Age: 47
Setting detail: RECURRING SERIES
Discharge: HOME | End: 2022-09-16
Payer: COMMERCIAL

## 2022-09-13 PROCEDURE — 97016 VASOPNEUMATIC DEVICE THERAPY: CPT

## 2022-09-13 PROCEDURE — 97110 THERAPEUTIC EXERCISES: CPT

## 2022-09-13 ASSESSMENT — PAIN SCALES - GENERAL: PAINLEVEL_OUTOF10: 4

## 2022-09-13 NOTE — PROGRESS NOTES
Emperartiz Galan  : 1975  Primary: Eddie Lewis  Secondary:  Katherin Llamas @ [address] [address]  Phone: [phone]  Fax: [phone] Plan Frequency: 2-3x per week for 4-8 weeks    Plan of Care/Certification Expiration Date: 22      PT Visit Info: Total # of Visits to Date: 2  Progress Note Counter: 2      OUTPATIENT PHYSICAL THERAPY:OP NOTE TYPE: Treatment Note 2022       Episode  }Appt Desk              Treatment Diagnosis:  Pain in Left Shoulder (M25.512)  Stiffness of Left Shoulder, Not elsewhere classified (M25.612)  Medical/Referring Diagnosis:  Unspecified injury of left shoulder and upper arm, initial encounter [A73.23RB]  Referring Physician:  Blu Kinney MD MD Orders:  PT Eval and Treat   Date of Onset:  Onset Date: 22     Allergies:   Patient has no known allergies. Restrictions/Precautions:  No data recordedNo data recorded   Interventions Planned (Treatment may consist of any combination of the following):    Current Treatment Recommendations: Strengthening; ROM; Endurance training; Neuromuscular re-education; Manual Therapy - Soft Tissue Mobilization; Manual Therapy - Joint Manipulation; Pain management; Home exercise program; Safety education & training; Equipment evaluation, education, & procurement; Modalities; Positioning; Therapeutic activities     Subjective Comments:  pt reports some stiffness  Initial:}    4/10Post Session:       4/10  Medications Last Reviewed:  2022  Updated Objective Findings:  See evaluation note from today  Treatment   THERAPEUTIC EXERCISE: (38 minutes):    Exercises per grid below to improve mobility, strength, balance and coordination. Required no visual, verbal, manual and tactile cues to promote proper body alignment, promote proper body posture, promote proper body mechanics and promote proper body breathing techniques.   Progressed resistance, range, repetitions and complexity of movement as indicated. THERAPEUTIC ACTIVITY: (  minutes): Therapeutic activities per grid below to improve mobility, strength, balance and coordination. Required no visual, verbal, manual and tactile cues to promote motor control of bilateral, lower extremity(s). NEUROMUSCULAR RE-EDUCATION: ( minutes):    Exercise/activities per grid below to improve balance, coordination, kinesthetic sense, posture and proprioception. Required no visual, verbal, manual and tactile cues to promote motor control of bilateral, lower extremity(s). MANUAL THERAPY: ( minutes):   Joint mobilization, Soft tissue mobilization, Manipulation and Manual lymphatic drainage was utilized and necessary because of the patient's restricted joint motion, painful spasm, loss of articular motion and restricted motion of soft tissue. MODALITIES: (10 minutes): Therapy in order to provide analgesia, relieve muscle spasm and reduce inflammation and edema. Vasopneumatic compression was performed on the LUE. The pt was positioned in sitting with the LUE supported. Skin was inspected pre and post modality no abnormalities seen. Date:  9/7/22   Date:  9/13/22   Date:     Activity/Exercise Parameters Parameters Parameters   Pt ed on biomechanics, PT POC, HEP  15 mins  15 mins     PROM LUE all planes  8 mins  8 mins     AAROM LUE shldr  40 ea direction     Standing EXT/ER/Horizontal ABD-ER theraband yellow   2 x 20                           Treatment/Session Summary:    Treatment Assessment:  pt tolerated tx well. Pt presents with slight ROM deficits in most planes. Pt presents with inadequate scapular retraction strength. Will progress PRN  Communication/Consultation:  None today  Equipment provided today:  None  Recommendations/Intent for next treatment session: Next visit will focus on her listed impairments.     Total Treatment Billable Duration:  48 minutes   Time In: 1510  Time Out: [address] [place] [address] Charge Capture  }Post Session Pain  MedBridge Portal  MD Guidelines  Scanned Media  Benefits  MyChart    Future Appointments   Date Time Provider Scott Duval   9/20/2022  1:00 PM Aleda Oppenheim, [address] [facility]   9/22/2022  3:15 PM Aleda Oppenheim, PT [facility]   9/26/2022  2:30 PM Aleda Oppenheim, PT [facility]   9/28/2022  2:30 PM Aleda Oppenheim, PT [facility]   10/5/2022  8:30 AM DIONTE Unger MD [facility]

## 2022-09-20 ENCOUNTER — APPOINTMENT (OUTPATIENT)
Dept: PHYSICAL THERAPY | Age: 47
End: 2022-09-20
Payer: COMMERCIAL

## 2022-09-22 ENCOUNTER — HOSPITAL ENCOUNTER (OUTPATIENT)
Dept: PHYSICAL THERAPY | Age: 47
Setting detail: RECURRING SERIES
Discharge: HOME | End: 2022-09-25
Payer: COMMERCIAL

## 2022-09-22 PROCEDURE — 97016 VASOPNEUMATIC DEVICE THERAPY: CPT

## 2022-09-22 PROCEDURE — 97110 THERAPEUTIC EXERCISES: CPT

## 2022-09-22 NOTE — PROGRESS NOTES
Dara Sport  : 1975  Primary: Luis Mondragon  Secondary:  [address] [address]  Phone: [phone]  Fax: [phone] Plan Frequency: 2-3x per week for 4-8 weeks    Plan of Care/Certification Expiration Date: 22      PT Visit Info: Total # of Visits to Date: 3  Progress Note Counter: 3      OUTPATIENT PHYSICAL THERAPY:OP NOTE TYPE: Treatment Note 2022       Episode  }Appt Desk              Treatment Diagnosis:  Pain in Left Shoulder (M25.512)  Stiffness of Left Shoulder, Not elsewhere classified (M25.612)  Medical/Referring Diagnosis:  Unspecified injury of left shoulder and upper arm, initial encounter [A02.52GF]  Referring Physician:  Leonidas Neumann MD MD Orders:  PT Eval and Treat   Date of Onset:  Onset Date: 22     Allergies:   Patient has no known allergies. Restrictions/Precautions:  No data recordedNo data recorded   Interventions Planned (Treatment may consist of any combination of the following):    Current Treatment Recommendations: Strengthening; ROM; Endurance training; Neuromuscular re-education; Manual Therapy - Soft Tissue Mobilization; Manual Therapy - Joint Manipulation; Pain management; Home exercise program; Safety education & training; Equipment evaluation, education, & procurement; Modalities; Positioning; Therapeutic activities     Subjective Comments:  pt reports of soreness and pain but minor in the LUE shldr  Initial:}     /10Post Session:        /10  Medications Last Reviewed:  2022  Updated Objective Findings:  See evaluation note from today  Treatment   THERAPEUTIC EXERCISE: (23 minutes):    Exercises per grid below to improve mobility, strength, balance and coordination. Required no visual, verbal, manual and tactile cues to promote proper body alignment, promote proper body posture, promote proper body mechanics and promote proper body breathing techniques.   Progressed resistance, range, repetitions and complexity of movement as indicated. THERAPEUTIC ACTIVITY: (  minutes): Therapeutic activities per grid below to improve mobility, strength, balance and coordination. Required no visual, verbal, manual and tactile cues to promote motor control of bilateral, lower extremity(s). NEUROMUSCULAR RE-EDUCATION: ( minutes):    Exercise/activities per grid below to improve balance, coordination, kinesthetic sense, posture and proprioception. Required no visual, verbal, manual and tactile cues to promote motor control of bilateral, lower extremity(s). MANUAL THERAPY: ( minutes):   Joint mobilization, Soft tissue mobilization, Manipulation and Manual lymphatic drainage was utilized and necessary because of the patient's restricted joint motion, painful spasm, loss of articular motion and restricted motion of soft tissue. MODALITIES: (10 minutes): Therapy in order to provide analgesia, relieve muscle spasm and reduce inflammation and edema. Vasopneumatic compression was performed on the LUE. The pt was positioned in sitting with the LUE supported. Skin was inspected pre and post modality no abnormalities seen. Date:  9/7/22   Date:  9/13/22   Date:  9/22/22     Activity/Exercise Parameters Parameters Parameters   Pt ed on biomechanics, PT POC, HEP  15 mins  15 mins     PROM LUE all planes  8 mins  8 mins  8 mins    AAROM LUE shldr  40 ea direction  40 ea direction    Standing EXT/ER/Horizontal ABD-ER theraband yellow   2 x 20  2 x 20                          Treatment/Session Summary:    Treatment Assessment:  pt tolerated tx well. Pt still has gross ROM deficits with the LUE shldr complex. Overall pt is making good progress. Communication/Consultation:  None today  Equipment provided today:  None  Recommendations/Intent for next treatment session: Next visit will focus on her listed impairments.     Total Treatment Billable Duration:  38 minutes   Time In: 1520  Time Out: [address] [facility], PT       Charge Capture  }Post Session Pain  MedBridge Portal  MD Guidelines  Scanned Media  Benefits  MyChart    Future Appointments   Date Time Provider Scott Simin   9/26/2022  2:30 PM Leena Landing, [facility]   9/28/2022  2:30 PM Leena Landing, [facility]   10/3/2022  9:15 AM Ronak Florian MD [facility]   10/5/2022  8:30 AM B Claude Dubin, MD [facility]

## 2022-09-26 ENCOUNTER — HOSPITAL ENCOUNTER (OUTPATIENT)
Dept: PHYSICAL THERAPY | Age: 47
Setting detail: RECURRING SERIES
Discharge: HOME | End: 2022-09-29
Payer: COMMERCIAL

## 2022-09-26 PROCEDURE — 97110 THERAPEUTIC EXERCISES: CPT

## 2022-09-26 PROCEDURE — 97016 VASOPNEUMATIC DEVICE THERAPY: CPT

## 2022-09-26 NOTE — PROGRESS NOTES
Deepika Hugo  : 1975  Primary: Reid Alvarez  Secondary:  [address] [address]  Phone: [phone]  Fax: [phone] Plan Frequency: 2-3x per week for 4-8 weeks    Plan of Care/Certification Expiration Date: 22      PT Visit Info: Total # of Visits to Date: 4  Progress Note Counter: 4      OUTPATIENT PHYSICAL THERAPY:OP NOTE TYPE: Treatment Note 2022       Episode  }Appt Desk              Treatment Diagnosis:  Pain in Left Shoulder (M25.512)  Stiffness of Left Shoulder, Not elsewhere classified (M25.612)  Medical/Referring Diagnosis:  Unspecified injury of left shoulder and upper arm, initial encounter [J87.10UR]  Referring Physician:  Brenda Gilliam MD MD Orders:  PT Eval and Treat   Date of Onset:  Onset Date: 22     Allergies:   Patient has no known allergies. Restrictions/Precautions:  No data recordedNo data recorded   Interventions Planned (Treatment may consist of any combination of the following):    Current Treatment Recommendations: Strengthening; ROM; Endurance training; Neuromuscular re-education; Manual Therapy - Soft Tissue Mobilization; Manual Therapy - Joint Manipulation; Pain management; Home exercise program; Safety education & training; Equipment evaluation, education, & procurement; Modalities; Positioning; Therapeutic activities     Subjective Comments:  pt states that she has some soreness in the LLE shldr  Initial:}     /10Post Session:        /10  Medications Last Reviewed:  2022  Updated Objective Findings:  See evaluation note from today  Treatment   THERAPEUTIC EXERCISE: (38 minutes):    Exercises per grid below to improve mobility, strength, balance and coordination. Required no visual, verbal, manual and tactile cues to promote proper body alignment, promote proper body posture, promote proper body mechanics and promote proper body breathing techniques.   Progressed resistance, PRN  Communication/Consultation:  None today  Equipment provided today:  None  Recommendations/Intent for next treatment session: Next visit will focus on her listed impairments.     Total Treatment Billable Duration:  48 minutes   Time In: 1430  Time Out: Chi Jc, PT       Charge Capture  }Post Session Pain  MedBridge Portal  MD Guidelines  Scanned Media  Benefits  MyChart    Future Appointments   Date Time Provider Scott Simin   9/28/2022  2:30 PM Christel Mireles [facility]   10/3/2022  9:15 AM Glen Conley MD [facility]   10/5/2022  8:30 AM DIONTE Cueto MD [facility]

## 2022-09-28 ENCOUNTER — HOSPITAL ENCOUNTER (OUTPATIENT)
Dept: PHYSICAL THERAPY | Age: 47
Setting detail: RECURRING SERIES
Discharge: HOME | End: 2022-10-01
Payer: COMMERCIAL

## 2022-09-28 PROCEDURE — 97110 THERAPEUTIC EXERCISES: CPT

## 2022-09-28 PROCEDURE — 97016 VASOPNEUMATIC DEVICE THERAPY: CPT

## 2022-09-28 ASSESSMENT — PAIN SCALES - GENERAL: PAINLEVEL_OUTOF10: 2

## 2022-09-28 NOTE — PROGRESS NOTES
Bautsita Garcia  : 1975  Primary: Aristeo Tolentino  Secondary:  [address] @ [address] [address]  Phone: [phone]  Fax: [phone] Plan Frequency: 2-3x per week for 4-8 weeks    Plan of Care/Certification Expiration Date: 22      PT Visit Info: Total # of Visits to Date: 5  Progress Note Counter: 5      OUTPATIENT PHYSICAL THERAPY:OP NOTE TYPE: Treatment Note 2022       Episode  }Appt Desk              Treatment Diagnosis:  Pain in Left Shoulder (M25.512)  Stiffness of Left Shoulder, Not elsewhere classified (M25.612)  Medical/Referring Diagnosis:  Unspecified injury of left shoulder and upper arm, initial encounter [H87.11CC]  Referring Physician:  Belinda Gabriel MD MD Orders:  PT Eval and Treat   Date of Onset:  Onset Date: 22     Allergies:   Patient has no known allergies. Restrictions/Precautions:  No data recordedNo data recorded   Interventions Planned (Treatment may consist of any combination of the following):    Current Treatment Recommendations: Strengthening; ROM; Endurance training; Neuromuscular re-education; Manual Therapy - Soft Tissue Mobilization; Manual Therapy - Joint Manipulation; Pain management; Home exercise program; Safety education & training; Equipment evaluation, education, & procurement; Modalities; Positioning; Therapeutic activities     Subjective Comments:  pt states that her AUSTYN beckwith is feeling sore but better  Initial:}    2/10Post Session:       2/10  Medications Last Reviewed:  2022  Updated Objective Findings:  See evaluation note from today  Treatment   THERAPEUTIC EXERCISE: (38 minutes):    Exercises per grid below to improve mobility, strength, balance and coordination. Required no visual, verbal, manual and tactile cues to promote proper body alignment, promote proper body posture, promote proper body mechanics and promote proper body breathing techniques.   Progressed resistance, range, repetitions and complexity of movement as indicated. THERAPEUTIC ACTIVITY: (  minutes): Therapeutic activities per grid below to improve mobility, strength, balance and coordination. Required no visual, verbal, manual and tactile cues to promote motor control of bilateral, lower extremity(s). NEUROMUSCULAR RE-EDUCATION: ( minutes):    Exercise/activities per grid below to improve balance, coordination, kinesthetic sense, posture and proprioception. Required no visual, verbal, manual and tactile cues to promote motor control of bilateral, lower extremity(s). MANUAL THERAPY: ( minutes):   Joint mobilization, Soft tissue mobilization, Manipulation and Manual lymphatic drainage was utilized and necessary because of the patient's restricted joint motion, painful spasm, loss of articular motion and restricted motion of soft tissue. MODALITIES: (10 minutes): Therapy in order to provide analgesia, relieve muscle spasm and reduce inflammation and edema. Vasopneumatic compression was performed on the LUE. The pt was positioned in sitting with the LUE supported. Skin was inspected pre and post modality no abnormalities seen. Date:  9/7/22   Date:  9/13/22   Date:  9/22/22   Date: 9/26/22   Date: 9/28/22     Activity/Exercise Parameters Parameters Parameters     Pt ed on biomechanics, PT POC, HEP  15 mins  15 mins       PROM LUE all planes  8 mins  8 mins  8 mins  8 mins  8 mins    AAROM LUE shldr  40 ea direction  40 ea direction  40 ea direction  40 ea direction   Standing EXT/ER/Horizontal ABD-ER theraband yellow   2 x 20  2 x 20  2 x 20  2 x 20    UBE     5 min CW/5 min CCW 5 min CW/5 min CCW   Standing scapular retractions     2 x 20 blue theraband 2 x 20 blue theraband               Treatment/Session Summary:    Treatment Assessment:  pt tolerated tx well. Pt still has inadeuate scapular retraction bilaterally however is making progress.  Will progress further PRN  Communication/Consultation:  None today  Equipment provided today:  None  Recommendations/Intent for next treatment session: Next visit will focus on her listed impairments.     Total Treatment Billable Duration:  48 minutes   Time In: 1430  Time Out: 2173    Thelma Martínez, PT       Charge Capture  }Post Session Pain  MedBridge Portal  MD Guidelines  Scanned Media  Benefits  MyChart    Future Appointments   Date Time Provider Scott Duval   10/3/2022  9:15 AM Teresita Hogan MD [facility] AMB   10/5/2022  8:30 AM DIONTE Cruz MD [facility] AMB

## 2022-10-03 ENCOUNTER — HOSPITAL ENCOUNTER (OUTPATIENT)
Dept: PHYSICAL THERAPY | Age: 47
Setting detail: RECURRING SERIES
End: 2022-10-03
Payer: COMMERCIAL

## 2022-10-03 ENCOUNTER — OFFICE VISIT (OUTPATIENT)
Dept: CARDIOLOGY CLINIC | Age: 47
End: 2022-10-03
Payer: COMMERCIAL

## 2022-10-03 VITALS
BODY MASS INDEX: 26.52 KG/M2 | HEIGHT: 64 IN | SYSTOLIC BLOOD PRESSURE: 104 MMHG | WEIGHT: 155.31 LBS | DIASTOLIC BLOOD PRESSURE: 66 MMHG | HEART RATE: 55 BPM

## 2022-10-03 DIAGNOSIS — I45.10 INCOMPLETE RIGHT BUNDLE BRANCH BLOCK: ICD-10-CM

## 2022-10-03 DIAGNOSIS — R00.1 SINUS BRADYCARDIA: ICD-10-CM

## 2022-10-03 DIAGNOSIS — I35.1 NONRHEUMATIC AORTIC VALVE INSUFFICIENCY: ICD-10-CM

## 2022-10-03 DIAGNOSIS — I47.1 SUPRAVENTRICULAR TACHYCARDIA (HCC): Primary | ICD-10-CM

## 2022-10-03 PROCEDURE — 93000 ELECTROCARDIOGRAM COMPLETE: CPT | Performed by: INTERNAL MEDICINE

## 2022-10-03 PROCEDURE — 99214 OFFICE O/P EST MOD 30 MIN: CPT | Performed by: INTERNAL MEDICINE

## 2022-10-03 RX ORDER — FLECAINIDE ACETATE 50 MG/1
50 TABLET ORAL PRN
Qty: 30 TABLET | Refills: 11 | Status: SHIPPED | OUTPATIENT
Start: 2022-10-03

## 2022-10-03 ASSESSMENT — ENCOUNTER SYMPTOMS
HEMATEMESIS: 0
EYE REDNESS: 0
ABDOMINAL PAIN: 0
HOARSE VOICE: 0
DOUBLE VISION: 0
WHEEZING: 0
HEMOPTYSIS: 0
HEMATOCHEZIA: 0
STRIDOR: 0

## 2022-10-03 NOTE — PROGRESS NOTES
[facility] CARDIOLOGY  [address] [facility], [address]  PHONE: [phone]          10/03/22    NAME:  Madyson Zhao  : 1975  MRN: [id number]         SUBJECTIVE:   Madyson Zhao is a 50 y.o. female seen for a visit regarding the following:     Chief Complaint   Patient presents with    Tachycardia     Yearly            HPI:    Cardiology problem list:   1. PSVT- AVNRT- 2021- Broke with adenosine  -3-day Holter monitor 2021 showed sinus rhythm with an average heart rate at 62 bpm, 1 4 beat run of PSVT   2. Borderline hypokalemia   3. History of hyperlipidemia - mother   4. Sinus bradycardia-she has had resting heart rates in the 40s all her life-mother also has this. 5.  Aortic regurgitation  -Echo from 2021 showed an EF at 55 to 60% with no regional wall motion abnormalities, mild left atrial enlargement, mild aortic regurgitation. Dear [facility], I saw Mr. Vazquez Rodríguez who is a pleasant 47-year-old woman in cardiovascular follow-up for palpitations/PSVT/sinus bradycardia, aortic regurgitation. Palpitations/PSVT  Background:-she has had episodes of palpitations off and on over the past several years but nothing that was persistent enough to warrant an ER visit.    -In 2021, she went into persistent SVT and required an ER visit with adenosine to break it. When we met with her, we set her up for a Holter monitor which showed only a short run of PSVT. We have prescribed as needed flecainide and she uses it about once or twice a month at the most.  Exercise can aggravate these episodes along with hot sun and relative dehydration. Sometimes a glass of alcohol can aggravate it to but this is almost always when she is in the hot sun. She limits herself to 2 cups of  coffee a day    hypokalemia: She said she has had muscle cramps off and on since she was a child and used to eat a lot of bananas before but has slacked off.   She understands that she would go back to eating foods that are rich in potassium. Family history of hyperlipidemia: She said her cholesterol levels have usually been well controlled. She walks/runs at least a few days a week which has been beneficial for her. Understands the need to continue this regularly and routinely. She has gained a few pounds over this year and is trying to lose weight again. Past Medical History, Past Surgical History, Family history, Social History, and Medications were all reviewed with the patient today and updated as necessary. No Known Allergies  Patient Active Problem List   Diagnosis    Sinus bradycardia    Menometrorrhagia    Incomplete right bundle branch block    Complex atypical endometrial hyperplasia    Postop check    PSVT (paroxysmal supraventricular tachycardia) (HCC)    Ventral hernia    Nonrheumatic aortic valve insufficiency     Past Medical History:   Diagnosis Date    Anemia     on iron supplement    Anxiety     Heart palpitations     pt states possibly r/t to anxiety and anemia    Menorrhagia     Rib fracture 2014    Seasonal allergic reaction      Past Surgical History:   Procedure Laterality Date    BREAST BIOPSY Right     DILATION AND CURETTAGE OF UTERUS  10/19/2018    HERNIA REPAIR  2014    ventral    HYSTERECTOMY (CERVIX STATUS UNKNOWN)  2019    with Bilateral Salpingectomy Still has both ovaries    WISDOM TOOTH EXTRACTION       Family History   Problem Relation Age of Onset    Alcohol Abuse Father     Kidney Disease Father     Hypertension Paternal Grandmother     Diabetes Paternal Grandmother     Breast Cancer Paternal Aunt [de-identified]    Heart Disease Paternal Grandmother      Social History     Tobacco Use    Smoking status: Former     Types: Cigarettes     Quit date: 2004     Years since quittin.8    Smokeless tobacco: Never   Substance Use Topics    Alcohol use:  Yes     Alcohol/week: 6.0 standard drinks     Current Outpatient Medications Medication Sig Dispense Refill    flecainide (TAMBOCOR) 50 MG tablet Take 1 tablet by mouth as needed (as needed- tachycardia) 30 tablet 11    Biotin 1000 MCG TABS Take by mouth      meloxicam (MOBIC) 7.5 MG tablet Take 1 tablet by mouth 2 times daily as needed for Pain Take 1 tablet po BID for 2 weeks as needed for pain 28 tablet 1     No current facility-administered medications for this visit. Review of Systems   Constitutional: Negative for chills and fever. HENT:  Negative for ear discharge, hoarse voice and stridor. Eyes:  Negative for double vision and redness. Cardiovascular:  Negative for cyanosis and syncope. Respiratory:  Negative for hemoptysis and wheezing. Endocrine: Negative for polydipsia and polyphagia. Hematologic/Lymphatic: Negative for adenopathy. Skin:  Negative for itching and rash. Musculoskeletal:  Negative for joint swelling and muscle weakness. Gastrointestinal:  Negative for abdominal pain, hematemesis and hematochezia. Genitourinary:  Negative for flank pain and nocturia. Neurological:  Negative for focal weakness and seizures. Psychiatric/Behavioral:  Negative for altered mental status and suicidal ideas. Allergic/Immunologic: Negative for hives. PHYSICAL EXAM:    /66   Pulse 55   Ht 5' 4\" (1.626 m)   Wt 155 lb 5 oz (70.4 kg)   BMI 26.66 kg/m²      Physical Exam    General: Alert and oriented in no acute distress  HEENT: Head is normocephalic, atraumatic, pupils are equal bilaterally, throat appears to be clear  Neck: No significant jugular venous distention no cervical bruits  Cardiovascular: S1 and S2 heard, regular rate and rhythm, no significant murmurs rubs or gallops. Respiratory: Clear to auscultation bilaterally with no adventitious sounds, respirations are normal  Abdomen: Soft, nontender, nondistended, bowel sounds present.   Extremities: No cyanosis clubbing or edema  Peripheral pulses: Bilateral radial artery pulses are palpated. Bilateral pedal pulses are well felt. Neuro: No facial droop and no gross focal motor deficits  Lymphatic: No significant cervical lymphadenopathy noted. Musculoskeletal: No significant redness or swelling noted in all exposed joints. Skin: No significant rashes noted the of the exposed regions. Medical problems and test results were reviewed with the patient today. No results found for this or any previous visit (from the past 672 hour(s)). No results found for: CHOL, CHOLPOCT, CHOLX, CHLST, CHOLV, HDL, HDLPOC, HDLC, LDL, LDLC, VLDLC, VLDL, TGLX, TRIGL,hemoglobin, basic metabolic panel, No results found for: TSH, TSH2, TSH3 ,No results found for: NA, K, CL, CO2, AGAP, GLU, BUN, CREA, GFRAA, CA, TP, ALB, GLOB, ALT, AST   No results found for: LDLCALC, LDLCHOLESTEROL, LDLDIRECT   No results found for: CREATININE   09/27/21    TRANSTHORACIC ECHOCARDIOGRAM (TTE) COMPLETE (CONTRAST/BUBBLE/3D PRN) 09/28/2021  4:20 PM (Final)    Narrative  This is a summary report. The complete report is available in the patient's medical record. If you cannot access the medical record, please contact the sending organization for a detailed fax or copy. · LA: Mildly dilated left atrium. Left Atrium volume index is 36.3 mL/m2. · TV: Mild tricuspid valve regurgitation is present. Right Ventricular Arterial Pressure (RVSP) is 30 mmHg. · AV: Mild aortic valve regurgitation is present. · LV: Estimated LVEF is 55 - 60%. Normal cavity size, wall thickness, systolic function (ejection fraction normal) and diastolic function.  Wall motion: normal.    Signed by: Kirit Guallpa on 9/28/2021  4:20 PM     No results found for: HGB   No results found for: PLT   EKG from 10/3/2022 showed sinus bradycardia at 55 bpm, incomplete right bundle branch block and nonspecific ST-T wave changes which is no significant change compared to prior EKG  ASSESSMENT and PLAN      Supraventricular tachycardia (HCC)  -     EKG 12 lead  - flecainide (TAMBOCOR) 50 MG tablet; Take 1 tablet by mouth as needed (as needed- tachycardia)  -Paroxysmal-maintaining sinus rhythm at this point and confirmed with her EKG. Normal QTC  Has flecainide to take as needed. Sinus bradycardia  -Normal for her-heart rates have been as low as in the 40s even at rest.  Has a Fitbit and monitors heart rates. Rates definitely  well within the normal range with activity. Incomplete right bundle branch block  -Noted again on EKG today. Not concerning    Nonrheumatic aortic valve insufficiency   -Mild aortic regurgitation noted on echocardiogram from September 2021-euvolemic on exam-we will need to be monitored over time but no strong reason to repeat an echo at this point. Overall Impression  -She has done well from a cardiac perspective overall. Euvolemic on exam, heart rates and blood pressures are within normal range. Mildly bradycardic with sinus bradycardia. She has occasional episodes of SVT and requires flecainide about once a month at the most to maybe twice a month. She wants to hold off on considering SVT ablation for now. We will continue flecainide as needed. Continues amount of routine cardiovascular exercise and walking/running. I would only need to see her in follow-up in about 12 months time. We refilled her flecainide to take as needed. For her mild aortic regurgitation, I hear no significant murmur and she is doing well otherwise, I see no strong reason to repeat an echo at this point. Return in about 1 year (around 10/3/2023) for psvt, [facility], [address]. Thank you for allowing us to participate in the care of your patient. If you have any further questions, please do not hesitate to contact us.   Sincerely,        Ruben Crocker MD   10/3/2022

## 2022-10-03 NOTE — PROGRESS NOTES
Bautista Garcia  : 1975  Primary: Aristeo Tolentino  Secondary:  [address] [address]  Phone: [phone]  Fax: [phone] Plan Frequency: 2-3x per week for 4-8 weeks    Plan of Care/Certification Expiration Date: 22      PT Visit Info: Total # of Visits to Date: 5  Progress Note Counter: 5  Canceled Appointment: 1         OUTPATIENT PHYSICAL THERAPY 10/3/2022     Appt Desk   Episode   MyChart      Ms. Stanislaw Hoff was a same-day cancellation for today's appointment. Patient called into work in [place].      Cancellation Number: [phone], [facility]    Future Appointments   Date Time Provider Scott Duval   10/3/2022  3:15 PM Alexa Bones, PTA [facility]   10/5/2022  8:30 AM DIONTE Padilla MD [facility] AMB   10/5/2022  3:15 PM Alexa Bones, [facility]   10/10/2022  2:30 PM Sandy Hoyos, PT [facility]   10/12/2022  2:30 PM Sandy Hoyos PT [facility]

## 2022-10-05 ENCOUNTER — HOSPITAL ENCOUNTER (OUTPATIENT)
Dept: PHYSICAL THERAPY | Age: 47
Setting detail: RECURRING SERIES
Discharge: HOME | End: 2022-10-08
Payer: COMMERCIAL

## 2022-10-05 PROCEDURE — 97110 THERAPEUTIC EXERCISES: CPT

## 2022-10-05 PROCEDURE — 97016 VASOPNEUMATIC DEVICE THERAPY: CPT

## 2022-10-05 PROCEDURE — 97140 MANUAL THERAPY 1/> REGIONS: CPT

## 2022-10-05 ASSESSMENT — PAIN SCALES - GENERAL: PAINLEVEL_OUTOF10: 5

## 2022-10-05 NOTE — PROGRESS NOTES
Reyes Mariano  : 1975  Primary: Payal Arriaza  Secondary:  [address] [address]  Phone: [phone]  Fax: [phone] Plan Frequency: 2-3x per week for 4-8 weeks    Plan of Care/Certification Expiration Date: 22      PT Visit Info: Total # of Visits to Date: 6  Progress Note Counter: 6  Canceled Appointment: 1      OUTPATIENT PHYSICAL THERAPY:OP NOTE TYPE: Treatment Note 10/5/2022       Episode  }Appt Desk              Treatment Diagnosis:  Pain in Left Shoulder (M25.512)  Stiffness of Left Shoulder, Not elsewhere classified (M25.612)  Medical/Referring Diagnosis:  Unspecified injury of left shoulder and upper arm, initial encounter [O89.10IJ]  Referring Physician:  Lillian Andrews MD MD Orders:  PT Eval and Treat   Date of Onset:  Onset Date: 22     Allergies:   Patient has no known allergies. Restrictions/Precautions:  No data recordedNo data recorded   Interventions Planned (Treatment may consist of any combination of the following):    Current Treatment Recommendations: Strengthening; ROM; Endurance training; Neuromuscular re-education; Manual Therapy - Soft Tissue Mobilization; Manual Therapy - Joint Manipulation; Pain management; Home exercise program; Safety education & training; Equipment evaluation, education, & procurement; Modalities; Positioning; Therapeutic activities     Subjective Comments:  Patient reports she is feeling pretty good today. Patient reports she ran for the 1st time this morning since her injury (about 2 miles). Initial:}    5/10Post Session:       2/10  Medications Last Reviewed:  10/5/2022  Updated Objective Findings:  None Today  Treatment   THERAPEUTIC EXERCISE: (25 minutes):    Exercises per grid below to improve mobility, strength, balance and coordination.   Required no visual, verbal, manual and tactile cues to promote proper body alignment, promote proper body posture, promote proper body mechanics and promote proper body breathing techniques. Progressed resistance, range, repetitions and complexity of movement as indicated. THERAPEUTIC ACTIVITY: (  minutes): Therapeutic activities per grid below to improve mobility, strength, balance and coordination. Required no visual, verbal, manual and tactile cues to promote motor control of bilateral, lower extremity(s). NEUROMUSCULAR RE-EDUCATION: ( minutes):    Exercise/activities per grid below to improve balance, coordination, kinesthetic sense, posture and proprioception. Required no visual, verbal, manual and tactile cues to promote motor control of bilateral, lower extremity(s). MANUAL THERAPY: (15 minutes):   Joint mobilization, Soft tissue mobilization, Manipulation and Manual lymphatic drainage was utilized and necessary because of the patient's restricted joint motion, painful spasm, loss of articular motion and restricted motion of soft tissue. MODALITIES: (15 minutes): Therapy in order to provide analgesia, relieve muscle spasm and reduce inflammation and edema. Vasopneumatic compression was performed on the LUE. The pt was positioned in sitting with the LUE supported. Skin was inspected pre and post modality no abnormalities seen.              Date:  9/7/22   Date:  9/13/22   Date:  9/22/22   Date: 9/26/22   Date: 9/28/22   Date:  10-5-22   Activity/Exercise Parameters Parameters Parameters      Pt ed on biomechanics, PT POC, HEP  15 mins  15 mins        PROM LUE all planes  8 mins  8 mins  8 mins  8 mins  8 mins  8 min   AAROM LUE shldr  40 ea direction  40 ea direction  40 ea direction  40 ea direction    Standing EXT/ER/Horizontal ABD-ER theraband yellow   2 x 20  2 x 20  2 x 20  2 x 20  GTB  LUE  ER x 20   IR  and ADD 2 x 15   YTB L   ABD x 20   UBE     5 min CW/5 min CCW 5 min CW/5 min CCW Level 4.0   5/5    Standing scapular retractions     2 x 20 blue theraband 2 x 20 blue theraband 2 x 20   BTB Treatment/Session Summary:    Treatment Assessment:  Patient improving with mobility and pain. She reports she is going to try and start running/walking more. Communication/Consultation:  None today  Equipment provided today:  None  Recommendations/Intent for next treatment session: Next visit will focus on her listed impairments.     Total Treatment Billable Duration:  55 minutes   Time In: 2591  Time Out: 1908 Nuris Peres PTA       Charge Capture  }Post Session Pain  MedBridge Portal  MD Guidelines  Scanned Media  Benefits  MyChart    Future Appointments   Date Time Provider Scott Duval   10/10/2022  2:30 PM Supriya Wilcox [facility]   10/12/2022  2:30 [facility]   10/26/2022  1:30 PM DIONTE Leslie MD [facility] AMB

## 2022-10-10 ENCOUNTER — HOSPITAL ENCOUNTER (OUTPATIENT)
Dept: PHYSICAL THERAPY | Age: 47
Setting detail: RECURRING SERIES
Discharge: HOME | End: 2022-10-13
Payer: COMMERCIAL

## 2022-10-10 PROCEDURE — 97110 THERAPEUTIC EXERCISES: CPT

## 2022-10-10 PROCEDURE — 97140 MANUAL THERAPY 1/> REGIONS: CPT

## 2022-10-10 PROCEDURE — 97016 VASOPNEUMATIC DEVICE THERAPY: CPT

## 2022-10-10 NOTE — PROGRESS NOTES
Roberth Mariano  : 1975  Primary: Alla To  Secondary:  [address] @ [address] [address]  Phone: [phone]  Fax: [phone] Plan Frequency: 2-3x per week for 4-8 weeks    Plan of Care/Certification Expiration Date: 22      PT Visit Info: Total # of Visits to Date: 6  Progress Note Counter: 10  Canceled Appointment: 1      OUTPATIENT PHYSICAL THERAPY:OP NOTE TYPE: Treatment Note 10/10/2022       Episode  }Appt Desk              Treatment Diagnosis:  Pain in Left Shoulder (M25.512)  Stiffness of Left Shoulder, Not elsewhere classified (M25.612)  Medical/Referring Diagnosis:  Unspecified injury of left shoulder and upper arm, initial encounter [I19.61VJ]  Referring Physician:  Henna Marti MD MD Orders:  PT Eval and Treat   Date of Onset:  Onset Date: 22     Allergies:   Patient has no known allergies. Restrictions/Precautions:  No data recordedNo data recorded   Interventions Planned (Treatment may consist of any combination of the following):    Current Treatment Recommendations: Strengthening; ROM; Endurance training; Neuromuscular re-education; Manual Therapy - Soft Tissue Mobilization; Manual Therapy - Joint Manipulation; Pain management; Home exercise program; Safety education & training; Equipment evaluation, education, & procurement; Modalities; Positioning; Therapeutic activities     Subjective Comments:  pt states thatcynthia worked in her garden for increased time and reports slight increase in soreness which is an improvement from last time  Initial:}     10Post Session:       2/10  Medications Last Reviewed:  10/10/2022  Updated Objective Findings:  None Today  Treatment   THERAPEUTIC EXERCISE: (25 minutes):    Exercises per grid below to improve mobility, strength, balance and coordination.   Required no visual, verbal, manual and tactile cues to promote proper body alignment, promote proper body posture, promote proper body mechanics and promote proper body breathing techniques. Progressed resistance, range, repetitions and complexity of movement as indicated. THERAPEUTIC ACTIVITY: (  minutes): Therapeutic activities per grid below to improve mobility, strength, balance and coordination. Required no visual, verbal, manual and tactile cues to promote motor control of bilateral, lower extremity(s). NEUROMUSCULAR RE-EDUCATION: ( minutes):    Exercise/activities per grid below to improve balance, coordination, kinesthetic sense, posture and proprioception. Required no visual, verbal, manual and tactile cues to promote motor control of bilateral, lower extremity(s). MANUAL THERAPY: (15 minutes):   Joint mobilization, Soft tissue mobilization, Manipulation and Manual lymphatic drainage was utilized and necessary because of the patient's restricted joint motion, painful spasm, loss of articular motion and restricted motion of soft tissue. MODALITIES: (0 minutes): Therapy in order to provide analgesia, relieve muscle spasm and reduce inflammation and edema. Vasopneumatic compression was performed on the LUE. The pt was positioned in sitting with the LUE supported. Skin was inspected pre and post modality no abnormalities seen.              Date:  9/7/22   Date:  9/13/22   Date:  9/22/22   Date: 9/26/22   Date: 9/28/22   Date:  10-5-22 Date: 10/10/22     Activity/Exercise Parameters Parameters Parameters       Pt ed on biomechanics, PT POC, HEP  15 mins  15 mins         PROM LUE all planes  8 mins  8 mins  8 mins  8 mins  8 mins  8 min 8 min   AAROM LUE shldr  40 ea direction  40 ea direction  40 ea direction  40 ea direction  40 ea direction   Standing EXT/ER/Horizontal ABD-ER theraband yellow   2 x 20  2 x 20  2 x 20  2 x 20  GTB  LUE  ER x 20   IR  and ADD 2 x 15   YTB L   ABD x 20    UBE     5 min CW/5 min CCW 5 min CW/5 min CCW Level 4.0   5/5  Level 4.0   5/5    Standing scapular retractions     2 x 20 blue theraband 2 x 20 blue theraband 2 x 20   BTB 2 x 20   BTB   R-sidelying ER        2 x 20 #1        Treatment/Session Summary:    Treatment Assessment:  pt tolerated tx well. Pt still has minor gross ROM deficits but is improving. Pt will progress to dynamic strengthening program. WIll progress PRN  Communication/Consultation:  None today  Equipment provided today:  None  Recommendations/Intent for next treatment session: Next visit will focus on her listed impairments.     Total Treatment Billable Duration:  38 minutes   Time In: 9602  Time Out: [facility], PT       Charge Capture  }Post Session Pain  MedBridge Portal  MD Guidelines  Scanned Media  Benefits  MyChart    Future Appointments   Date Time Provider Scott Duval   10/12/2022  2:30 PM Supriya Acosta [facility]   10/17/2022  2:30 PM Larnell Duane, [facility]   10/19/2022  2:30 PM Kimberly Welch, PT [facility]   10/26/2022  1:30 PM DIONTE Cade MD [facility]

## 2022-10-12 ENCOUNTER — HOSPITAL ENCOUNTER (OUTPATIENT)
Dept: PHYSICAL THERAPY | Age: 47
Setting detail: RECURRING SERIES
Discharge: HOME | End: 2022-10-15
Payer: COMMERCIAL

## 2022-10-12 PROCEDURE — 97110 THERAPEUTIC EXERCISES: CPT

## 2022-10-12 PROCEDURE — 97016 VASOPNEUMATIC DEVICE THERAPY: CPT

## 2022-10-12 PROCEDURE — 97140 MANUAL THERAPY 1/> REGIONS: CPT

## 2022-10-12 NOTE — PROGRESS NOTES
Tony Mariano  : 1975  Primary: Raz Tello  Secondary:  [address] @ [address] [address]  Phone: [phone]  Fax: [phone] Plan Frequency: 2-3x per week for 4-8 weeks    Plan of Care/Certification Expiration Date: 22      PT Visit Info: Total # of Visits to Date: 7  Progress Note Counter: 7  Canceled Appointment: 1      OUTPATIENT PHYSICAL THERAPY:OP NOTE TYPE: Treatment Note 10/12/2022       Episode  }Appt Desk              Treatment Diagnosis:  Pain in Left Shoulder (M25.512)  Stiffness of Left Shoulder, Not elsewhere classified (M25.612)  Medical/Referring Diagnosis:  Unspecified injury of left shoulder and upper arm, initial encounter [G53.82OS]  Referring Physician:  Georgina Bales MD MD Orders:  PT Eval and Treat   Date of Onset:  Onset Date: 22     Allergies:   Patient has no known allergies. Restrictions/Precautions:  No data recordedNo data recorded   Interventions Planned (Treatment may consist of any combination of the following):    Current Treatment Recommendations: Strengthening; ROM; Endurance training; Neuromuscular re-education; Manual Therapy - Soft Tissue Mobilization; Manual Therapy - Joint Manipulation; Pain management; Home exercise program; Safety education & training; Equipment evaluation, education, & procurement; Modalities; Positioning; Therapeutic activities     Subjective Comments:  pt states that she feels good in LUE  Initial:10Post Session:       2/10  Medications Last Reviewed:  10/12/2022  Updated Objective Findings:  None Today  Treatment   THERAPEUTIC EXERCISE: (25 minutes):    Exercises per grid below to improve mobility, strength, balance and coordination. Required no visual, verbal, manual and tactile cues to promote proper body alignment, promote proper body posture, promote proper body mechanics and promote proper body breathing techniques.   Progressed resistance, range, repetitions and complexity of movement as indicated. THERAPEUTIC ACTIVITY: (  minutes): Therapeutic activities per grid below to improve mobility, strength, balance and coordination. Required no visual, verbal, manual and tactile cues to promote motor control of bilateral, lower extremity(s). NEUROMUSCULAR RE-EDUCATION: ( minutes):    Exercise/activities per grid below to improve balance, coordination, kinesthetic sense, posture and proprioception. Required no visual, verbal, manual and tactile cues to promote motor control of bilateral, lower extremity(s). MANUAL THERAPY: (15 minutes):   Joint mobilization, Soft tissue mobilization, Manipulation and Manual lymphatic drainage was utilized and necessary because of the patient's restricted joint motion, painful spasm, loss of articular motion and restricted motion of soft tissue. MODALITIES: (0 minutes): Therapy in order to provide analgesia, relieve muscle spasm and reduce inflammation and edema. Vasopneumatic compression was performed on the LUE. The pt was positioned in sitting with the LUE supported. Skin was inspected pre and post modality no abnormalities seen.              Date:  9/7/22   Date:  9/13/22   Date:  9/22/22   Date: 9/26/22   Date: 9/28/22   Date:  10-5-22 Date: 10/10/22   Date: 10/12/22      Activity/Exercise Parameters Parameters Parameters        Pt ed on biomechanics, PT POC, HEP  15 mins  15 mins          PROM LUE all planes  8 mins  8 mins  8 mins  8 mins  8 mins  8 min 8 min 8 min   AAROM LUE shldr  40 ea direction  40 ea direction  40 ea direction  40 ea direction  40 ea direction 40 ea direction   Standing EXT/ER/Horizontal ABD-ER theraband yellow   2 x 20  2 x 20  2 x 20  2 x 20  GTB  LUE  ER x 20   IR  and ADD 2 x 15   YTB L   ABD x 20     UBE     5 min CW/5 min CCW 5 min CW/5 min CCW Level 4.0   5/5  Level 4.0   5/5  Level 4.0   5/5    Standing scapular retractions     2 x 20 blue theraband 2 x 20 blue theraband 2 x 20   BTB 2 x 20   BTB 2 x 20   BTB   R-sidelying ER        2 x 20 #1  2 x 20 #1       Treatment/Session Summary:    Treatment Assessment:  pt tolerated tx well. pt still has some minor gross ROM in the LUE shldr. Pt still presents with ER weakness and stability dysnfuction. Will progress PRN  Communication/Consultation:  None today  Equipment provided today:  None  Recommendations/Intent for next treatment session: Next visit will focus on her listed impairments.     Total Treatment Billable Duration:  48 minutes   Time In: 1430  Time Out: [facility] PT       Charge Capture  }Post Session Pain  MedBridge Portal  MD Guidelines  Scanned Media  Benefits  MyChart    Future Appointments   Date Time Provider Scott Duval   10/17/2022  2:30 PM Rashida Franklin PTA [facility]   10/19/2022  2:30 PM Mansi Arreola PT [facility]   10/26/2022  1:30 PM DIONTE Bunn MD [facility]

## 2022-10-17 ENCOUNTER — HOSPITAL ENCOUNTER (OUTPATIENT)
Dept: PHYSICAL THERAPY | Age: 47
Setting detail: RECURRING SERIES
Discharge: HOME | End: 2022-10-20
Payer: COMMERCIAL

## 2022-10-17 PROCEDURE — 97140 MANUAL THERAPY 1/> REGIONS: CPT

## 2022-10-17 PROCEDURE — 97110 THERAPEUTIC EXERCISES: CPT

## 2022-10-17 ASSESSMENT — PAIN SCALES - GENERAL: PAINLEVEL_OUTOF10: 4

## 2022-10-17 NOTE — PROGRESS NOTES
Smith Mariano  : 1975  Primary: Jonny Che  Secondary:  Letha Goodwin @ [address] [place] [address]  Phone: [phone]  Fax: [phone] Plan Frequency: 2-3x per week for 4-8 weeks    Plan of Care/Certification Expiration Date: 22      PT Visit Info: Total # of Visits to Date: 8  Progress Note Counter: 6  Canceled Appointment: 1      OUTPATIENT PHYSICAL THERAPY:OP NOTE TYPE: Treatment Note 10/17/2022       Episode  }Appt Desk              Treatment Diagnosis:  Pain in Left Shoulder (M25.512)  Stiffness of Left Shoulder, Not elsewhere classified (M25.612)  Medical/Referring Diagnosis:  Unspecified injury of left shoulder and upper arm, initial encounter [I43.73TP]  Referring Physician:  Azeem Mccollum MD MD Orders:  PT Eval and Treat   Date of Onset:  Onset Date: 22     Allergies:   Patient has no known allergies. Restrictions/Precautions:  No data recordedNo data recorded   Interventions Planned (Treatment may consist of any combination of the following):    Current Treatment Recommendations: Strengthening; ROM; Endurance training; Neuromuscular re-education; Manual Therapy - Soft Tissue Mobilization; Manual Therapy - Joint Manipulation; Pain management; Home exercise program; Safety education & training; Equipment evaluation, education, & procurement; Modalities; Positioning; Therapeutic activities     Subjective Comments:  Pt reports feeling well. Has soreness from gardening this weekend. Initial:}    4/10Post Session:       4/10  Medications Last Reviewed:  10/17/2022  Updated Objective Findings:  None Today  Treatment   THERAPEUTIC EXERCISE: (34 minutes):    Exercises per grid below to improve mobility, strength, balance and coordination. Required no visual, verbal, manual and tactile cues to promote proper body alignment, promote proper body posture, promote proper body mechanics and promote proper body breathing techniques. Progressed resistance, range, repetitions and complexity of movement as indicated. THERAPEUTIC ACTIVITY: ( minutes): Therapeutic activities per grid below to improve mobility, strength, balance and coordination. Required no visual, verbal, manual and tactile cues to promote motor control of bilateral, lower extremity(s). NEUROMUSCULAR RE-EDUCATION: ( minutes):    Exercise/activities per grid below to improve balance, coordination, kinesthetic sense, posture and proprioception. Required no visual, verbal, manual and tactile cues to promote motor control of bilateral, lower extremity(s). MANUAL THERAPY: (10 minutes):   Joint mobilization, Soft tissue mobilization, and Manipulation was utilized and necessary because of the patient's restricted joint motion, painful spasm, loss of articular motion and restricted motion of soft tissue. MODALITIES: ( minutes): Therapy in order to provide analgesia, relieve muscle spasm and reduce inflammation and edema. Vasopneumatic compression was performed on the LUE. The pt was positioned in sitting with the LUE supported. Skin was inspected pre and post modality no abnormalities seen.              Date: 9/26/22   Date: 9/28/22   Date:  10-5-22 Date: 10/10/22   Date: 10/12/22    Date:  10-17-22   Activity/Exercise         Pt ed on biomechanics, PT POC, HEP          PROM LUE all planes  8 mins  8 mins  8 min 8 min 8 min 10 mins    AAROM LUE shldr 40 ea direction  40 ea direction  40 ea direction 40 ea direction 40 ea    Standing EXT/ER/Horizontal ABD-ER theraband yellow  2 x 20  2 x 20  GTB  LUE  ER x 20   IR  and ADD 2 x 15   YTB L   ABD x 20   GTB  LUE  ER x 20   IR  and ADD 2 x 15   YTB L   ABD x 20     UBE  5 min CW/5 min CCW 5 min CW/5 min CCW Level 4.0   5/5  Level 4.0   5/5  Level 4.0   5/5  Level 4  5/5   Standing scapular retractions  2 x 20 blue theraband 2 x 20 blue theraband 2 x 20   BTB 2 x 20   BTB 2 x 20   BTB 2 x 20   BTB   R-sidelying ER     2 x 20 #1  2 x 20 #1 3 x 10 #2       Treatment/Session Summary:    Treatment Assessment:  Pt responded well to therapy. Felt soreness towards the end of sets due to fatigue. Pt showed increase in ROM. Plan to build on endurance and strength in functional activities. Communication/Consultation:  None today  Equipment provided today:  None  Recommendations/Intent for next treatment session: Next visit will focus on increasing endurance and ROM.      Total Treatment Billable Duration:  44 minutes   Time In: 9450  Time Out: 1800 [address]  [place], [place]       Charge Capture  }Post Session Pain  MedBridge Portal  MD Guidelines  Scanned Media  Benefits  MyChart    Future Appointments   Date Time Provider Scott Duval   10/19/2022  2:30 PM Benny Enriquez PT [facility]   10/26/2022  1:30 PM DIONTE Batista MD [facility]   10/26/2022  2:30 PM [place] [facility]

## 2022-10-19 ENCOUNTER — HOSPITAL ENCOUNTER (OUTPATIENT)
Dept: PHYSICAL THERAPY | Age: 47
Setting detail: RECURRING SERIES
Discharge: HOME | End: 2022-10-22
Payer: COMMERCIAL

## 2022-10-19 PROCEDURE — 97110 THERAPEUTIC EXERCISES: CPT

## 2022-10-19 PROCEDURE — 97016 VASOPNEUMATIC DEVICE THERAPY: CPT

## 2022-10-19 PROCEDURE — 97140 MANUAL THERAPY 1/> REGIONS: CPT

## 2022-10-19 NOTE — PROGRESS NOTES
Cherie Mariano  : 1975  Primary: Annie Nick  Secondary:  Jose Alfredo Cain @ [address] [place] [address]  Phone: [phone]  Fax: [phone] Plan Frequency: 2-3x per week for 4-8 weeks    Plan of Care/Certification Expiration Date: 22      PT Visit Info: Total # of Visits to Date: 9  Progress Note Counter: 5  Canceled Appointment: 1      OUTPATIENT PHYSICAL THERAPY:OP NOTE TYPE: Treatment Note 10/19/2022       Episode  }Appt Desk              Treatment Diagnosis:  Pain in Left Shoulder (M25.512)  Stiffness of Left Shoulder, Not elsewhere classified (M25.612)  Medical/Referring Diagnosis:  Unspecified injury of left shoulder and upper arm, initial encounter [L14.96AG]  Referring Physician:  Evelyn Henderson MD MD Orders:  PT Eval and Treat   Date of Onset:  Onset Date: 22     Allergies:   Patient has no known allergies. Restrictions/Precautions:  No data recordedNo data recorded   Interventions Planned (Treatment may consist of any combination of the following):    Current Treatment Recommendations: Strengthening; ROM; Endurance training; Neuromuscular re-education; Manual Therapy - Soft Tissue Mobilization; Manual Therapy - Joint Manipulation; Pain management; Home exercise program; Safety education & training; Equipment evaluation, education, & procurement; Modalities; Positioning; Therapeutic activities     Subjective Comments:  pt states that she has been working in her garden more and is starting to have more soreness in the LUE  Initial:10Post Session:       4/10  Medications Last Reviewed:  10/19/2022  Updated Objective Findings:  None Today  Treatment   THERAPEUTIC EXERCISE: (23 minutes):    Exercises per grid below to improve mobility, strength, balance and coordination.   Required no visual, verbal, manual and tactile cues to promote proper body alignment, promote proper body posture, promote proper body mechanics and promote proper body breathing techniques. Progressed resistance, range, repetitions and complexity of movement as indicated. THERAPEUTIC ACTIVITY: ( minutes): Therapeutic activities per grid below to improve mobility, strength, balance and coordination. Required no visual, verbal, manual and tactile cues to promote motor control of bilateral, lower extremity(s). NEUROMUSCULAR RE-EDUCATION: ( minutes):    Exercise/activities per grid below to improve balance, coordination, kinesthetic sense, posture and proprioception. Required no visual, verbal, manual and tactile cues to promote motor control of bilateral, lower extremity(s). MANUAL THERAPY: (10 minutes):   Joint mobilization, Soft tissue mobilization, and Manipulation was utilized and necessary because of the patient's restricted joint motion, painful spasm, loss of articular motion and restricted motion of soft tissue. MODALITIES: (10 minutes): Therapy in order to provide analgesia, relieve muscle spasm and reduce inflammation and edema. Vasopneumatic compression was performed on the LUE. The pt was positioned in sitting with the LUE supported. Skin was inspected pre and post modality no abnormalities seen.              Date: 9/26/22   Date: 9/28/22   Date:  10-5-22 Date: 10/10/22   Date: 10/12/22    Date:  10-17-22 Date: 10/19/22     Activity/Exercise          Pt ed on biomechanics, PT POC, HEP           PROM LUE all planes  8 mins  8 mins  8 min 8 min 8 min 10 mins  10 mins    AAROM LUE shldr 40 ea direction  40 ea direction  40 ea direction 40 ea direction 40 ea  40 ea    Standing EXT/ER/Horizontal ABD-ER theraband yellow  2 x 20  2 x 20  GTB  LUE  ER x 20   IR  and ADD 2 x 15   YTB L   ABD x 20   GTB  LUE  ER x 20   IR  and ADD 2 x 15   YTB L   ABD x 20   2 x 20    UBE  5 min CW/5 min CCW 5 min CW/5 min CCW Level 4.0   5/5  Level 4.0   5/5  Level 4.0   5/5  Level 4  5/5 Level 4  5/5   Standing scapular retractions  2 x 20 blue theraband 2 x 20 blue theraband 2 x 20   BTB 2 x 20   BTB 2 x 20   BTB 2 x 20   BTB 2 x 20   BTB   R-sidelying ER     2 x 20 #1  2 x 20 #1 3 x 10 #2 3 x 10 #2       Treatment/Session Summary:    Treatment Assessment:  pt tolerated tx well. Pt still has some soreness and ROM issues with her LUE shldr but is starting to show normal signs of ROM. Strength and stability are still a deficit. Will progress prn  Communication/Consultation:  None today  Equipment provided today:  None  Recommendations/Intent for next treatment session: Next visit will focus on increasing endurance and ROM.      Total Treatment Billable Duration:  48 minutes   Time In: 1430  Time Out: [address], PT       Charge Capture  }Post Session Pain  MedBridge Portal  MD Guidelines  Scanned Media  Benefits  MyChart    Future Appointments   Date Time Provider Scott Duval   10/26/2022  1:30 PM DIONTE Padilla MD [facility]   10/26/2022  2:30 PM Alexa Jane PTA [facility]

## 2022-10-26 ENCOUNTER — OFFICE VISIT (OUTPATIENT)
Dept: ORTHOPEDIC SURGERY | Age: 47
End: 2022-10-26
Payer: COMMERCIAL

## 2022-10-26 ENCOUNTER — APPOINTMENT (OUTPATIENT)
Dept: PHYSICAL THERAPY | Age: 47
End: 2022-10-26
Payer: COMMERCIAL

## 2022-10-26 DIAGNOSIS — M25.512 LEFT SHOULDER PAIN, UNSPECIFIED CHRONICITY: Primary | ICD-10-CM

## 2022-10-26 PROCEDURE — 99212 OFFICE O/P EST SF 10 MIN: CPT | Performed by: ORTHOPAEDIC SURGERY

## 2022-10-26 NOTE — PROGRESS NOTES
Name: Brielle Gonsales  YOB: 1975  Gender: female  MRN: [id number]    CC:   Chief Complaint   Patient presents with    Follow-up     Recheck left shoulder          HPI: Patient presents for FU of left shoulder pain. At her last visit, we discussed at her last visit she had more muscular symptoms vs possible labral problem. She deferred injection at her last visit. Recall:  Patient had initial injury occurred on 7/22/22 when she was trying to catch herself on a chair and she felt a pop. She notes some posterior lateral pain. Has notes popping. Denies numbness and tingling. No instability. Patient enjoys gardening. Left hand dominant. No recent treatment but has had MRI completed. Most of the pain is posterior. She has been going to physical therapy with Migue Zaragoza.     No Known Allergies  Past Medical History:   Diagnosis Date    Anemia     on iron supplement    Anxiety     Heart palpitations     pt states possibly r/t to anxiety and anemia    Menorrhagia     Rib fracture 5/2014    Seasonal allergic reaction      Past Surgical History:   Procedure Laterality Date    BREAST BIOPSY Right     DILATION AND CURETTAGE OF UTERUS  10/19/2018    HERNIA REPAIR  2014    ventral    HYSTERECTOMY (CERVIX STATUS UNKNOWN)  01/03/2019    with Bilateral Salpingectomy Still has both ovaries    WISDOM TOOTH EXTRACTION       Family History   Problem Relation Age of Onset    Alcohol Abuse Father     Kidney Disease Father     Hypertension Paternal Grandmother     Diabetes Paternal Grandmother     Breast Cancer Paternal Aunt [de-identified]    Heart Disease Paternal Grandmother      Social History     Socioeconomic History    Marital status:      Spouse name: Not on file    Number of children: Not on file    Years of education: Not on file    Highest education level: Not on file   Occupational History    Not on file   Tobacco Use    Smoking status: Former     Types: Cigarettes     Quit date: 12/1/2004     Years since quittin.9    Smokeless tobacco: Never   Substance and Sexual Activity    Alcohol use: Yes     Alcohol/week: 6.0 standard drinks    Drug use: No    Sexual activity: Not on file     Comment: Hysterectomy    Other Topics Concern    Not on file   Social History Narrative    Lives with  Lukas Gore  since  children from prior marriage  Children Manuela   Ariana Power     Lost daughter age 8  after taking Miralax for constipation     Social Determinants of Health     Financial Resource Strain: Low Risk     Difficulty of Paying Living Expenses: Not hard at all   Food Insecurity: No Food Insecurity    Worried About Running Out of Food in the Last Year: Never true    Ran Out of Food in the Last Year: Never true   Transportation Needs: Not on file   Physical Activity: Not on file   Stress: Not on file   Social Connections: Not on file   Intimate Partner Violence: Not on file   Housing Stability: Not on file        No flowsheet data found. Review of Systems  Non-contributory    PE:    Full range of motion. Still has a little discomfort with certain impingement motions. Recall MRI left shoulder from 22:  FINDINGS: Well distended glenohumeral joint by intrarticular contrast.     Normal size and contour of the humeral head. Normal configuration of the  glenoid. Normal thickness and contour of the cartilage of the glenohumeral  joint. No glenoid joint capsule strain or tear. Intact glenohumeral  ligaments. Normal thickness and signal of the rotator cuff muscles. Normal  configuration and signal of the rotator cuff without evidence for a partial  or full-thickness tear. No downfacing osteophytes of the AC joint or compromise of the superior  outlet. No contrast within the subacromial - subdeltoid bursa. Smooth undersurface of the rotator cuff. I favor mild widening of the superior sublabral sulcus which may indicate a  type 1 SLAP tear.      IMPRESSION: Possible type 1 SLAP tear.     MRI was reviewed. Rotator cuff looks good. Cartilage does not show any significant arthritic changes. Cannot rule out SLAP tear but do not see any definitive indications that this is definitely occurring other than some mild signal seen on coronal image 10. A/Plan:     ICD-10-CM    1. Left shoulder pain, unspecified chronicity  M25.512            Progress from therapy to home exercise program.  If she has symptoms for continued progress or get worse to return we can consider injection. She is previously had an MRI as noted above. Return if symptoms worsen or fail to improve.         Kb Urias MD  10/26/22

## 2022-10-31 ENCOUNTER — HOSPITAL ENCOUNTER (OUTPATIENT)
Dept: PHYSICAL THERAPY | Age: 47
Setting detail: RECURRING SERIES
Discharge: HOME | End: 2022-11-03
Payer: COMMERCIAL

## 2022-10-31 PROCEDURE — 97110 THERAPEUTIC EXERCISES: CPT

## 2022-10-31 NOTE — PROGRESS NOTES
Selin Mariano  : 1975  Primary: Allegra Mason  Secondary:  [address]  Phone: [phone]  Fax: [phone] Plan Frequency: 2-3x per week for 4-8 weeks    Plan of Care/Certification Expiration Date: 22      PT Visit Info: Total # of Visits to Date: 10  Progress Note Counter: 1  Canceled Appointment: 1      OUTPATIENT PHYSICAL THERAPY:OP NOTE TYPE: Treatment Note 10/31/2022       Episode  }Appt Desk              Treatment Diagnosis:  Pain in Left Shoulder (M25.512)  Stiffness of Left Shoulder, Not elsewhere classified (M25.612)  Medical/Referring Diagnosis:  Unspecified injury of left shoulder and upper arm, initial encounter [V79.67WX]  Referring Physician:  Loree Painting MD MD Orders:  PT Eval and Treat   Date of Onset:  Onset Date: 22     Allergies:   Patient has no known allergies. Restrictions/Precautions:  No data recordedNo data recorded   Interventions Planned (Treatment may consist of any combination of the following):    Current Treatment Recommendations: Strengthening; ROM; Endurance training; Neuromuscular re-education; Manual Therapy - Soft Tissue Mobilization; Manual Therapy - Joint Manipulation; Pain management; Home exercise program; Safety education & training; Equipment evaluation, education, & procurement; Modalities; Positioning; Therapeutic activities     Subjective Comments:  pt states that her AUSTYN beckwith is feeling good  Initial:10Post Session:       3/10  Medications Last Reviewed:  10/31/2022  Updated Objective Findings:  None Today  Treatment   THERAPEUTIC EXERCISE: (23 minutes):    Exercises per grid below to improve mobility, strength, balance and coordination. Required no visual, verbal, manual and tactile cues to promote proper body alignment, promote proper body posture, promote proper body mechanics and promote proper body breathing techniques.   Progressed resistance, range, repetitions and complexity of movement as indicated. THERAPEUTIC ACTIVITY: ( minutes): Therapeutic activities per grid below to improve mobility, strength, balance and coordination. Required no visual, verbal, manual and tactile cues to promote motor control of bilateral, lower extremity(s). NEUROMUSCULAR RE-EDUCATION: ( minutes):    Exercise/activities per grid below to improve balance, coordination, kinesthetic sense, posture and proprioception. Required no visual, verbal, manual and tactile cues to promote motor control of bilateral, lower extremity(s). MANUAL THERAPY: (10 minutes):   Joint mobilization, Soft tissue mobilization, and Manipulation was utilized and necessary because of the patient's restricted joint motion, painful spasm, loss of articular motion and restricted motion of soft tissue. MODALITIES: (10 minutes): Therapy in order to provide analgesia, relieve muscle spasm and reduce inflammation and edema. Vasopneumatic compression was performed on the LUE. The pt was positioned in sitting with the LUE supported. Skin was inspected pre and post modality no abnormalities seen.              Date: 9/26/22   Date: 9/28/22   Date:  10-5-22 Date: 10/10/22   Date: 10/12/22    Date:  10-17-22 Date: 10/19/22   Date: 10/31/22     Activity/Exercise           Pt ed on biomechanics, PT POC, HEP            PROM LUE all planes  8 mins  8 mins  8 min 8 min 8 min 10 mins  10 mins  10 mins    AAROM LUE shldr 40 ea direction  40 ea direction  40 ea direction 40 ea direction 40 ea  40 ea  40 ea    Standing EXT/ER/Horizontal ABD-ER theraband yellow  2 x 20  2 x 20  GTB  LUE  ER x 20   IR  and ADD 2 x 15   YTB L   ABD x 20   GTB  LUE  ER x 20   IR  and ADD 2 x 15   YTB L   ABD x 20   2 x 20  2 x 20   UBE  5 min CW/5 min CCW 5 min CW/5 min CCW Level 4.0   5/5  Level 4.0   5/5  Level 4.0   5/5  Level 4  5/5 Level 4  5/5 Level 4  5/5   Standing scapular retractions  2 x 20 blue theraband 2 x 20 blue theraband 2 x 20   BTB 2 x 20   BTB 2 x 20   BTB 2 x 20   BTB 2 x 20   BTB 2 x 20   BTB   R-sidelying ER     2 x 20 #1  2 x 20 #1 3 x 10 #2 3 x 10 #2 3 x 10 #2       Treatment/Session Summary:    Treatment Assessment:  see progress note  Communication/Consultation:  None today  Equipment provided today:  None  Recommendations/Intent for next treatment session: Next visit will focus on increasing endurance and ROM. Total Treatment Billable Duration:  38 minutes   Time In: 4668  Time Out: 1568      Monica Grady, PT       Charge Capture  }Post Session Pain  [facility] Portal  MD Guidelines  Scanned Media  Benefits  MyChart    No future appointments.

## 2022-11-16 ENCOUNTER — OFFICE VISIT (OUTPATIENT)
Dept: OBGYN CLINIC | Age: 47
End: 2022-11-16
Payer: COMMERCIAL

## 2022-11-16 VITALS — BODY MASS INDEX: 26.95 KG/M2 | SYSTOLIC BLOOD PRESSURE: 122 MMHG | WEIGHT: 157 LBS | DIASTOLIC BLOOD PRESSURE: 74 MMHG

## 2022-11-16 DIAGNOSIS — N64.4 BREAST PAIN: ICD-10-CM

## 2022-11-16 DIAGNOSIS — Z80.3 FAMILY HISTORY OF BREAST CANCER: ICD-10-CM

## 2022-11-16 DIAGNOSIS — N63.20 MASS OF LEFT BREAST, UNSPECIFIED QUADRANT: Primary | ICD-10-CM

## 2022-11-16 PROCEDURE — 99214 OFFICE O/P EST MOD 30 MIN: CPT | Performed by: NURSE PRACTITIONER

## 2022-11-16 NOTE — PROGRESS NOTES
Barrie Painting  is a 50 y.o. No obstetric history on file. who presents today with complaints left breast pain. She has a known cyst in the left breast. She notes last week the area where cyst is became large but was not necessarily painful and had no other worrisome characteristics, just became enlarged and palpable. She had pain that increased significantly over the weekend and started to exhibit signs of infxn, States it became very hard with heat. The circumference around the mass became red. No fever or chills. She took 5 tablets of amoxicillin at home and symptoms became better quickly. Denies nipple discharge. Sx much improved today, but still with enlarged area in breast. Skin not currently red but was red before taking abx. Only mild tenderness, had much improvement in pain after taking abx. No fever/chills, no increased heat to mass. No LMP recorded. Patient has had a hysterectomy. .   Last MGM 2021 Benign BD4    Personal Breast History:  History of right breast bx   Family Breast History:  Paternal aunt breast cancer. Social History     Tobacco Use    Smoking status: Former     Types: Cigarettes     Quit date: 2004     Years since quittin.9    Smokeless tobacco: Never   Substance Use Topics    Alcohol use: Yes     Alcohol/week: 6.0 standard drinks       HISTORY:    Current Outpatient Medications   Medication Sig Dispense Refill    POTASSIUM PO Take by mouth      MAGNESIUM PO Take by mouth      flecainide (TAMBOCOR) 50 MG tablet Take 1 tablet by mouth as needed (as needed- tachycardia) 30 tablet 11    Biotin 1000 MCG TABS Take by mouth       No current facility-administered medications for this visit. ROS:  Gyn ROS: she complains of L breast mass    PHYSICAL EXAM:  Blood pressure 122/74, weight 157 lb (71.2 kg). The patient appears well, alert, oriented x 3, in no distress. Normal thought process and judgement.   Right Breast:  normal without suspicious masses, skin or nipple changes or axillary nodes, self-exam is taught and encouraged. Left Breast:  Large, firm mass approx 9cm noted at 2-3oclock. No s/sx infxn--there is no increased heat, no redness, no nipple discharge. self-exam is taught and encouraged    ASSESSMENT & PLAN  Diagnoses and all orders for this visit:    Mass of left breast, unspecified quadrant  -     Cancel: [facility] DIGITAL DIAGNOSTIC W OR WO CAD BILATERAL; Future  -     Cancel: US BREAST COMPLETE LEFT; Future  -     [facility]ANNIE DIGITAL DIAGNOSTIC W OR WO CAD BILATERAL; Future  -     US BREAST COMPLETE LEFT; Future    Family history of breast cancer  -     Cancel: JEANNIE DIGITAL DIAGNOSTIC W OR WO CAD BILATERAL; Future  -     Cancel: US BREAST COMPLETE LEFT; Future  -     JEANNIE DIGITAL DIAGNOSTIC W OR WO CAD BILATERAL; Future  -     US BREAST COMPLETE LEFT; Future    Breast pain    - lengthy disc with pt---previous sx from the weekend sound suspicious for infxn of known cyst, however there is no current evidence or s/sx of infxn.   -continue to monitor and call for worsening pain, sx, signs of infxn.  -will check dx imaging.  -may need referral to general surgery but will wait for results of imaging prior to referral.  - SBE techniques reviewed, encouraged and taught. No follow-up provider specified. Greater than 50% of this 30 minute visit was spent in counseling to the above related topics.

## 2022-11-21 ENCOUNTER — TELEPHONE (OUTPATIENT)
Dept: OBGYN CLINIC | Age: 47
End: 2022-11-21

## 2022-11-21 DIAGNOSIS — N63.20 MASS OF LEFT BREAST, UNSPECIFIED QUADRANT: Primary | ICD-10-CM

## 2022-11-21 DIAGNOSIS — N64.4 BREAST PAIN: ICD-10-CM

## 2022-11-21 NOTE — TELEPHONE ENCOUNTER
Per verbal by Morris French, [facility] dx mammogram and left breast ultrasound were to be ordered as STAT vs routine. New orders placed stating STAT. Spoke with hospital scheduling to get appt moved up from 12/1/22. I was able to get patient moved to 11/23/22 @ 12:30pm.  [employer] message sent to patient.

## 2022-11-23 ENCOUNTER — HOSPITAL ENCOUNTER (OUTPATIENT)
Dept: MAMMOGRAPHY | Age: 47
Discharge: HOME | End: 2022-11-26
Payer: COMMERCIAL

## 2022-11-23 ENCOUNTER — APPOINTMENT (OUTPATIENT)
Dept: MAMMOGRAPHY | Age: 47
End: 2022-11-23
Payer: COMMERCIAL

## 2022-11-23 DIAGNOSIS — Z80.3 FAMILY HISTORY OF BREAST CANCER: ICD-10-CM

## 2022-11-23 DIAGNOSIS — N63.20 MASS OF LEFT BREAST, UNSPECIFIED QUADRANT: ICD-10-CM

## 2022-11-23 PROCEDURE — 77066 DX MAMMO INCL CAD BI: CPT

## 2022-11-23 PROCEDURE — 76642 ULTRASOUND BREAST LIMITED: CPT

## 2022-11-29 DIAGNOSIS — Z80.3 FAMILY HISTORY OF BREAST CANCER: ICD-10-CM

## 2022-11-29 DIAGNOSIS — R92.2 DENSE BREAST TISSUE ON MAMMOGRAM: ICD-10-CM

## 2022-11-29 DIAGNOSIS — N63.20 MASS OF LEFT BREAST, UNSPECIFIED QUADRANT: Primary | ICD-10-CM

## 2022-11-30 DIAGNOSIS — R92.2 DENSE BREAST TISSUE ON MAMMOGRAM: Primary | ICD-10-CM

## 2022-11-30 NOTE — PROGRESS NOTES
Orders Placed This Encounter   Procedures    MRI BREAST BILATERAL W WO CONTRAST     Standing Status:   Future     Standing Expiration Date:   11/30/2023     Order Specific Question:   STAT Creatinine as needed:     Answer:   No     Order Specific Question:   What is the sedation requirement? Answer:   None     Pt wants to move forward with breast MRI. Imaging ordered.

## 2022-12-05 DIAGNOSIS — R92.2 DENSE BREAST TISSUE ON MAMMOGRAM: ICD-10-CM

## 2022-12-06 NOTE — DISCHARGE SUMMARY
Chanell Mariano  : 1975  Primary: Charline Hardin  Secondary:  [address] [place] [address]  Phone: [phone]  Fax: [phone] Plan Frequency: 2-3x per week for 4-8 weeks  Plan of Care/Certification Expiration Date: 22      PT Visit Info: Total # of Visits to Date: 10  Progress Note Counter: 1  Canceled Appointment: 1      OUTPATIENT PHYSICAL THERAPY:OP NOTE TYPE: Discharge Summary 10/31/2022               Episode  Appt Desk         Treatment Diagnosis:  Pain in Left Shoulder (M25.512)  Stiffness of Left Shoulder, Not elsewhere classified (M25.612)  * No diagnoses found *  Medical/Referring Diagnosis:  Unspecified injury of left shoulder and upper arm, initial encounter [Q86.92SI]  Referring Physician:  Celeste Funk MD MD Orders:  PT Eval and Treat   Return MD Appt:  10/5/22  Date of Onset:  Onset Date: 22     Allergies:  Patient has no known allergies. Restrictions/Precautions:    No data recordedNo data recorded   Medications Last Reviewed:  10/31/2022     SUBJECTIVE   History of Injury/Illness (Reason for Referral):  Pt had a \"popping\" sensation when trying to stand from a chair. Pt noticed that since the initial injury the pt states that she gets posterior shoulder pain. Pt denies radicular s/s above and below the injury site. MARIAH was LUE shldr abduction with an posterior-anterior force 90/90 position   Patient Stated Goal(s):  \"would like to move my LUE shldr without pain\"  Initial:      /10 Post Session:     3/10  Past Medical History/Comorbidities:   Ms. Maryellen Bloom  has a past medical history of Anemia, Anxiety, Heart palpitations, Menorrhagia, Rib fracture, and Seasonal allergic reaction. Ms. Maryellen Bloom  has a past surgical history that includes [place] tooth extraction; Hysterectomy (2019); Dilation and curettage of uterus (10/19/2018); Breast biopsy (Right); and hernia repair ().   Social History/Living Environment:   Lives With: Spouse  Type of Home: House     Prior Level of Function/Work/Activity:   Prior level of function: Independent  No data recordedNo data recorded   Learning:   Does the patient/guardian have any barriers to learning?: No barriers     Fall Risk Scale: Arcos Total Score: 0  Arcos Fall Risk: Low (0-24)         OBJECTIVE     NATURE OF CONDITION Date: 9/7/22   Date: 10/31/22     Highest level of pain 8 4   Lowest level of pain 4 3   Aggravating factors Reaching, ER, lifting, pushing  Reaching, protraction      Alleviating factors rest rest   Frequency of symptoms Everyday Everyday    Description of symptoms Aching, weak, burning Aching, burning      PALPATION Date: 9/7/22   Date: 10/31/22     Location of tenderness LUE Anterior-lateral  LUE anterior shldr       RANGE OF MOTION Date: 9/7/22   Date: 10/31/22       RIGHT LEFT RIGHT LEFT   Shoulder FLEX     Shoulder ABD *    Shoulder ER WNL 20 WNL 90   Shoulder IR WNL 75 WNL 90          Shoulder FLEX (PROM) WNL     Shoulder ABD (PROM) WNL         STRENGTH Date: 9/7/22   Date: 10/31/22       RIGHT LEFT RIGHT LEFT   Shoulder Flexion 4-/5 3-/5 WNL WNL   Shoulder Abduction 4-/5 3-/5 WNL WNL   Shoulder ER  4-/5 3-/5 WNL WNL   Shoulder IR  4-/5 3-/5 WNL WNL   Elbow Flexion 4-/5 3-/5 WNL WNL   Elbow Extension 4-/5 3-/5 WNL WNL     Painful arc sign (+)  George Kub (+)  Elbow flexion test (-)   Obriens active compression test (+)   Vinny (-)   Infraspintaus (-)       NEUROLOGICAL SCREEN  Dermatomes: WNL BUE  Reflexes: 2+ BUE    JOINT PLAY  Anterior: restricted  Posterior: firm  Inferior: firm     SKIN INTEGRITY  Clean, dry, & intact    ASSESSMENT   Initial Assessment:  Pt is a 51 yo female who presents to the [facility] with LUE shldr pain. Pt PMH is past hx of rib fractures and a hernia repair (2014). Pt has received Dx imaging via X-ray/MRI which were unremarkable.  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 Pt would benefit from skilled PT in order to address her impairments. Pt has reached her first progress visit. Pt has attended 10 PT visits while also achieving 5/6 DC goals, with the remaining goals in progress towards completion. Pt still has some limitations with GHJ up glides when reaching forward and in protraction. Pt would benefit to continue with skilled PT in order to address her remaining impairments. Pt is currently being discharged from formal PT services, Ms. Leonila Hoyos has attended 10 PT visits while completing 6/6 DC goals. Pt has achieved all DC goals and this pt wishes to be discharged from [facility]. Therefore, Ms. Leonila Hoyos will be officially discharged from formal PT services. Thank you for this referral.     Problem List: (Impacting functional limitations): Body Structures, Functions, Activity Limitations Requiring Skilled Therapeutic Intervention: Decreased functional mobility ; Decreased ADL status; Decreased ROM; Decreased tolerance to work activity; Decreased strength; Increased pain     Therapy Prognosis:   Therapy Prognosis: Good     Assessment Complexity:      PLAN   Effective Dates: 9/7/22   TO Plan of Care/Certification Expiration Date: 11/06/22     Frequency/Duration: Plan Frequency: 2-3x per week for 4-8 weeks     Interventions Planned (Treatment may consist of any combination of the following):    Current Treatment Recommendations: Strengthening; ROM; Endurance training; Neuromuscular re-education; Manual Therapy - Soft Tissue Mobilization; Manual Therapy - Joint Manipulation; Pain management; Home exercise program; Safety education & training; Equipment evaluation, education, & procurement; Modalities; Positioning;  Therapeutic activities     Goals: (Goals have been discussed and agreed upon with patient.)  Short-Term Functional Goals: Time Frame: 4 weeks   Pt will achieve a 20/55 QuickDash score in order to improve ADLs and function (MET)  Pt will achieve a VAS pain score of 5/10 in order to improve ADLS and function (MET)  Pt will improve all BLE ROM/Strength by 50% in order to improve ADLs and function (MET)  Discharge Goals: Time Frame: 8 weeks   Pt will achieve a 15/55 QuickDash score in order to improve ADLs and function (MET)  Pt will achieve a VAS pain score of 3/10 in order to improve ADLS and function (MET)  Pt will improve all BLE ROM/Strength by % in order to improve ADLs and function (MET)          Outcome Measure: Tool Used: Disabilities of the Arm, Shoulder and Hand (DASH) Questionnaire - Quick Version  Score:  Initial: 27/55  Most Recent: X/55 (Date: -- )   Interpretation of Score: The DASH is designed to measure the activities of daily living in person's with upper extremity dysfunction or pain. Each section is scored on a 1-5 scale, 5 representing the greatest disability. The scores of each section are added together for a total score of 55. Medical Necessity:   > Skilled intervention continues to be required due to her listed impairments. Reason For Services/Other Comments:  > Patient continues to require skilled intervention due to her listed impairments . Total Duration:  Time In: 1515  Time Out: 2648    Regarding Tnoy Mariano's therapy, I certify that the treatment plan above will be carried out by a therapist or under their direction.   Thank you for this referral,  Kimberly Welch PT     Referring Physician Signature: Georgina Bales MD         Post Session Pain  Charge Capture   POC Link  Treatment Note Link  MD Guidelines  [facility]

## 2023-01-23 ENCOUNTER — OFFICE VISIT (OUTPATIENT)
Dept: OBGYN CLINIC | Age: 48
End: 2023-01-23
Payer: COMMERCIAL

## 2023-01-23 VITALS
WEIGHT: 158.8 LBS | SYSTOLIC BLOOD PRESSURE: 118 MMHG | HEIGHT: 64 IN | BODY MASS INDEX: 27.11 KG/M2 | DIASTOLIC BLOOD PRESSURE: 68 MMHG

## 2023-01-23 DIAGNOSIS — N89.8 VAGINAL IRRITATION: Primary | ICD-10-CM

## 2023-01-23 DIAGNOSIS — N89.8 VAGINAL ODOR: ICD-10-CM

## 2023-01-23 PROCEDURE — 99213 OFFICE O/P EST LOW 20 MIN: CPT | Performed by: NURSE PRACTITIONER

## 2023-01-23 NOTE — PROGRESS NOTES
The patient is a 50 y.o. No obstetric history on file who is seen for c/o BV symptoms. Pt states that symptoms have been present for a little over 1 week. Started with irritation and odor. She feels that the odor is somewhat better now but she does still continue to have irritation. Denies significant discharge. Denies new partners. Denies new soaps/detergents. Denies burning/frequency with urination. Denies flank pain/hematuria. Denies fevers. Denies pelvic/abd pain. Denies pain/bleeding with intercourse. HISTORY:    No obstetric history on file. No LMP recorded. Patient has had a hysterectomy. Sexual History:  has sex with males  Contraception:  status post hysterectomy  Current Outpatient Medications on File Prior to Visit   Medication Sig Dispense Refill    POTASSIUM PO Take by mouth      MAGNESIUM PO Take by mouth      flecainide (TAMBOCOR) 50 MG tablet Take 1 tablet by mouth as needed (as needed- tachycardia) 30 tablet 11    Biotin 1000 MCG TABS Take by mouth       No current facility-administered medications on file prior to visit. ROS:  Feeling well. No dyspnea or chest pain on exertion. No abdominal pain, change in bowel habits, black or bloody stools. No urinary tract symptoms. GYN ROS: see above. PHYSICAL EXAM:  Blood pressure 118/68, height 5' 4\" (1.626 m), weight 158 lb 12.8 oz (72 kg). The patient appears well, alert, oriented x 3, in no distress. Lungs are clear. Heart RRR, no murmurs. Abdomen soft without tenderness, guarding, mass or organomegaly. Pelvic: VULVA: normal appearing vulva with no masses, tenderness or lesions, VAGINA: normal appearing vagina with normal color and discharge, no lesions. ASSESSMENT:  Encounter Diagnoses   Name Primary?     Vaginal irritation Yes    Vaginal odor        PLAN:  All questions answered  NuSwab vaginitis plus  Probiotic  Rephresh ok  Discussed avoidance of scented soaps/products  Will call pt with results and manage accordingly Orders Placed This Encounter   Procedures    Nuswab Vaginitis (VG)     Standing Status:   Future     Standing Expiration Date:   1/23/2024           Supervising physician is Dr. Dean Farah. Greater than 50% of the 20 minute visit were spent in counseling to the above topics.

## 2023-01-26 LAB
A VAGINAE DNA VAG QL NAA+PROBE: ABNORMAL SCORE
BVAB2 DNA VAG QL NAA+PROBE: ABNORMAL SCORE
C ALBICANS DNA VAG QL NAA+PROBE: NEGATIVE
C GLABRATA DNA VAG QL NAA+PROBE: NEGATIVE
C TRACH RRNA SPEC QL NAA+PROBE: NEGATIVE
MEGA1 DNA VAG QL NAA+PROBE: ABNORMAL SCORE
N GONORRHOEA RRNA SPEC QL NAA+PROBE: NEGATIVE
SPECIMEN SOURCE: ABNORMAL
T VAGINALIS RRNA SPEC QL NAA+PROBE: NEGATIVE

## 2023-01-26 RX ORDER — METRONIDAZOLE 500 MG/1
500 TABLET ORAL 2 TIMES DAILY
Qty: 14 TABLET | Refills: 0 | Status: SHIPPED | OUTPATIENT
Start: 2023-01-26 | End: 2023-02-02

## 2024-08-08 NOTE — PLAN OF CARE
Chief Complaint   Patient presents with    Follow-up       HPI: Citlali Yip presents today for obesity management.  Struggling with weight stall.  No weight loss last several months.  She is frustrated.  Having issues with back pain.  Feels like she is doing better sometimes with eating and she is walking 2 miles 3 times a week.    Continues to skip meals and will occasionally just eat snack food.  She relies heavily on cereal.  She admits to being a very picky eater.  She has an appointment with the dietitian next week.      8 months status post: [x] Gastric bypass for treatment of morbid obesity   [] Sleeve gastrectomy for treatment of morbid obesity   [] Conversion sleeve to gastric bypass   [] Conversion lap band to gastric bypass   [] SAD-I  [] DS  [] Revision      Presents today for [x] Obesity management   [] Weight regain   [] Abdominal pain   [x] Medication management     Pain:  Yes   Yes, Location some low back pain     Taking any pain relieving medications No          No Meeting protein goals (60 grams minimum per day)  [] Shakes [] Bars [x] Other think she gets enough protein but diet recall she may consume 30 g of protein a day at best.  Snacking some on nab crackers, cereal   No Meeting hydration goals (64 oz minimum daily) \"getting better\"   [x] Water [] Juices [] Sugar-free add-ins [] Electrolyte drink/mix   Denies alcohol   No Tracking intake        Bowels moving  [x] Most days   [] Few times per week       Constipated   No    Using laxatives  No      Nausea   No    Regurgitation   No      Vitamin compliance  Yes      [x] Bariatric MVI 45 mg iron  [] Other vitamin regimen   [x] Calcium Citrate 1200 -1500 mg daily in divided doses   [] B12 injections 1000 mcg/ml   [] Prescription D2 50,000 iu  [] Weekly       [] Twice weekly       [] Three times weekly       [] Every 14 days       [] Monthly      Activity   Yes    The patient's reported level of exercise: Walking 2 miles 3 times a week.      Sleep  Tony Mariano  : 1975  Primary: Raz Tello  Secondary:  [address] [address]  Phone: [phone]  Fax: [phone] Plan Frequency: 2-3x per week for 4-8 weeks  Plan of Care/Certification Expiration Date: 22      PT Visit Info: Total # of Visits to Date: 10  Progress Note Counter: 1  Canceled Appointment: 1      OUTPATIENT PHYSICAL THERAPY:OP NOTE TYPE: Progress Report 10/31/2022               Episode  Appt Desk         Treatment Diagnosis:  Pain in Left Shoulder (M25.512)  Stiffness of Left Shoulder, Not elsewhere classified (M25.612)  * No diagnoses found *  Medical/Referring Diagnosis:  Unspecified injury of left shoulder and upper arm, initial encounter [S33.36TN]  Referring Physician:  Georgina Bales MD MD Orders:  PT Eval and Treat   Return MD Appt:  10/5/22  Date of Onset:  Onset Date: 22     Allergies:  Patient has no known allergies. Restrictions/Precautions:    No data recordedNo data recorded   Medications Last Reviewed:  10/31/2022     SUBJECTIVE   History of Injury/Illness (Reason for Referral):  Pt had a \"popping\" sensation when trying to stand from a chair. Pt noticed that since the initial injury the pt states that she gets posterior shoulder pain. Pt denies radicular s/s above and below the injury site. MARIAH was LUE shldr abduction with an posterior-anterior force 90/90 position   Patient Stated Goal(s):  \"would like to move my LUE shldr without pain\"  Initial:      /10 Post Session:     3/10  Past Medical History/Comorbidities:   Ms. Amarilis Lopez  has a past medical history of Anemia, Anxiety, Heart palpitations, Menorrhagia, Rib fracture, and Seasonal allergic reaction. Ms. Amarilis Lopez  has a past surgical history that includes [place] tooth extraction; Hysterectomy (2019); Dilation and curettage of uterus (10/19/2018); Breast biopsy (Right); and hernia repair ().   Social History/Living Environment:   Lives With: Spouse  Type of Home: House     Prior Level of Function/Work/Activity:   Prior level of function: Independent  No data recordedNo data recorded   Learning:   Does the patient/guardian have any barriers to learning?: No barriers     Fall Risk Scale: Arcos Total Score: 0  Arcos Fall Risk: Low (0-24)         OBJECTIVE     NATURE OF CONDITION Date: 9/7/22   Date: 10/31/22     Highest level of pain 8 4   Lowest level of pain 4 3   Aggravating factors Reaching, ER, lifting, pushing  Reaching, protraction      Alleviating factors rest rest   Frequency of symptoms Everyday Everyday    Description of symptoms Aching, weak, burning Aching, burning      PALPATION Date: 9/7/22   Date: 10/31/22     Location of tenderness LUE Anterior-lateral  LUE anterior shldr       RANGE OF MOTION Date: 9/7/22   Date: 10/31/22       RIGHT LEFT RIGHT LEFT   Shoulder FLEX     Shoulder ABD *    Shoulder ER WNL 20 WNL 90   Shoulder IR WNL 75 WNL 90          Shoulder FLEX (PROM) WNL     Shoulder ABD (PROM) WNL         STRENGTH Date: 9/7/22   Date: 10/31/22       RIGHT LEFT RIGHT LEFT   Shoulder Flexion 4-/5 3-/5 WNL WNL   Shoulder Abduction 4-/5 3-/5 WNL WNL   Shoulder ER  4-/5 3-/5 WNL WNL   Shoulder IR  4-/5 3-/5 WNL WNL   Elbow Flexion 4-/5 3-/5 WNL WNL   Elbow Extension 4-/5 3-/5 WNL WNL     Painful arc sign (+)  Cherl Del (+)  Elbow flexion test (-)   Obriens active compression test (+)   Vinny (-)   Infraspintaus (-)       NEUROLOGICAL SCREEN  Dermatomes: WNL BUE  Reflexes: 2+ BUE    JOINT PLAY  Anterior: restricted  Posterior: firm  Inferior: firm     SKIN INTEGRITY  Clean, dry, & intact    ASSESSMENT   Initial Assessment:  Pt is a 49 yo female who presents to the [facility] with LUE shldr pain. Pt PMH is past hx of rib fractures and a hernia repair (2014). Pt has received Dx imaging via X-ray/MRI which were unremarkable.  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 Pt would benefit from skilled PT in order to address her impairments. Pt has reached her first progress visit. Pt has attended 10 PT visits while also achieving 5/6 DC goals, with the remaining goals in progress towards completion. Pt still has some limitations with GHJ up glides when reaching forward and in protraction. Pt would benefit to continue with skilled PT in order to address her remaining impairments. Problem List: (Impacting functional limitations): Body Structures, Functions, Activity Limitations Requiring Skilled Therapeutic Intervention: Decreased functional mobility ; Decreased ADL status; Decreased ROM; Decreased tolerance to work activity; Decreased strength; Increased pain     Therapy Prognosis:   Therapy Prognosis: Good     Assessment Complexity:      PLAN   Effective Dates: 9/7/22   TO Plan of Care/Certification Expiration Date: 11/06/22     Frequency/Duration: Plan Frequency: 2-3x per week for 4-8 weeks     Interventions Planned (Treatment may consist of any combination of the following):    Current Treatment Recommendations: Strengthening; ROM; Endurance training; Neuromuscular re-education; Manual Therapy - Soft Tissue Mobilization; Manual Therapy - Joint Manipulation; Pain management; Home exercise program; Safety education & training; Equipment evaluation, education, & procurement; Modalities; Positioning;  Therapeutic activities     Goals: (Goals have been discussed and agreed upon with patient.)  Short-Term Functional Goals: Time Frame: 4 weeks   Pt will achieve a 20/55 QuickDash score in order to improve ADLs and function (MET)  Pt will achieve a VAS pain score of 5/10 in order to improve ADLS and function (MET)  Pt will improve all BLE ROM/Strength by 50% in order to improve ADLs and function (MET)  Discharge Goals: Time Frame: 8 weeks   Pt will achieve a 15/55 QuickDash score in order to improve ADLs and function (MET)  Pt will achieve a VAS pain score of 3/10 in order to improve ADLS and function (MET)  Pt will improve all BLE ROM/Strength by % in order to improve ADLs and function (In progress)          Outcome Measure: Tool Used: Disabilities of the Arm, Shoulder and Hand (DASH) Questionnaire - Quick Version  Score:  Initial: 27/55  Most Recent: X/55 (Date: -- )   Interpretation of Score: The DASH is designed to measure the activities of daily living in person's with upper extremity dysfunction or pain. Each section is scored on a 1-5 scale, 5 representing the greatest disability. The scores of each section are added together for a total score of 55. Medical Necessity:   > Skilled intervention continues to be required due to her listed impairments. Reason For Services/Other Comments:  > Patient continues to require skilled intervention due to her listed impairments . Total Duration:  Time In: 1515  Time Out: 9440    Regarding Reyes Mariano's therapy, I certify that the treatment plan above will be carried out by a therapist or under their direction.   Thank you for this referral,  Vonnie Hills, PT     Referring Physician Signature: Lillian Andrews MD _______________________________ Date _____________        Post Session Pain  Charge Capture   POC Link  Treatment Note Link  MD Guidelines  MyChar

## (undated) DEVICE — SOLUTION IV 1000ML 0.9% SOD CHL

## (undated) DEVICE — CARDINAL HEALTH FLEXIBLE LIGHT HANDLE COVER: Brand: CARDINAL HEALTH

## (undated) DEVICE — (D)PREP SKN CHLRAPRP APPL 26ML -- CONVERT TO ITEM 371833

## (undated) DEVICE — SYR BULB 60ML IRRIGATION -- CONVERT TO ITEM 116413

## (undated) DEVICE — Z INACTIVE CONVERTED USE 2421969 CONTAINER SPEC 120ML CLR POLYSTYR 10% NEUT BUFF FRMLN ZN

## (undated) DEVICE — 2, DISPOSABLE SUCTION/IRRIGATOR WITHOUT DISPOSABLE TIP: Brand: STRYKEFLOW

## (undated) DEVICE — AMD ANTIMICROBIAL NON-ADHERENT PAD,0.2% POLYHEXAMETHYLENE BIGUANIDE HCI (PHMB): Brand: TELFA

## (undated) DEVICE — SINGLE BASIN: Brand: CARDINAL HEALTH

## (undated) DEVICE — TELFA NON-ADHERENT ABSORBENT DRESSING: Brand: TELFA

## (undated) DEVICE — VCARE LARGE, UTERINE MANIPULATOR, VAGINAL-CERVICAL-AHLUWALIA'S-RETRACTOR-ELEVATOR: Brand: VCARE

## (undated) DEVICE — 40585 XL ADVANCED TRENDELENBURG POSITIONING KIT: Brand: 40585 XL ADVANCED TRENDELENBURG POSITIONING KIT

## (undated) DEVICE — GOWN,REINF,POLY,ECL,PP SLV,XL: Brand: MEDLINE

## (undated) DEVICE — PERI-PAD,MODERATE: Brand: CURITY

## (undated) DEVICE — SUTURE VCRL SZ 4-0 L27IN ABSRB UD L19MM PS-2 3/8 CIR PRIM J426H

## (undated) DEVICE — BLADELESS OPTICAL TROCAR WITH FIXATION CANNULA: Brand: VERSAPORT

## (undated) DEVICE — KENDALL SCD EXPRESS SLEEVES, KNEE LENGTH, MEDIUM: Brand: KENDALL SCD

## (undated) DEVICE — 2000CC GUARDIAN II: Brand: GUARDIAN

## (undated) DEVICE — FILTER SMK EVAC FLO CLR MEGADYNE

## (undated) DEVICE — DRAPE TWL SURG 16X26IN BLU ORB04] ALLCARE INC]

## (undated) DEVICE — COVER LT HNDL FOR OR SURG LAMP

## (undated) DEVICE — SUTURE STRATAFIX SPRL PDS + SZ 2-0 L6IN ABSRB VLT L36MM SXPP1B409

## (undated) DEVICE — UNIVERSAL FIXATION CANNULA: Brand: VERSAONE

## (undated) DEVICE — SOLUTION IRRIG 3000ML 0.9% SOD CHL FLX CONT 0797208] ICU MEDICAL INC]

## (undated) DEVICE — SUTURE VCRL SZ 0 L36IN ABSRB UD L36MM CT-1 1/2 CIR J946H

## (undated) DEVICE — Device

## (undated) DEVICE — CATH URETH INTMIT 16FRX16IN --

## (undated) DEVICE — INSUFFLATION NEEDLE: Brand: SURGINEEDLE

## (undated) DEVICE — SOL ANTI-FOG 6ML MEDC -- MEDICHOICE - CONVERT TO 358427

## (undated) DEVICE — LOGICUT SCISSOR LENGTH 320MM: Brand: LOGI - LAPAROSCOPIC INSTRUMENT SYSTEM

## (undated) DEVICE — REM POLYHESIVE ADULT PATIENT RETURN ELECTRODE: Brand: VALLEYLAB

## (undated) DEVICE — DRAPE,UNDERBUTTOCKS,PCH,STERILE: Brand: MEDLINE

## (undated) DEVICE — CYSTO: Brand: MEDLINE INDUSTRIES, INC.

## (undated) DEVICE — APPLICATOR BNDG 1MM ADH PREMIERPRO EXOFIN

## (undated) DEVICE — TRAY PREP DRY W/ PREM GLV 2 APPL 6 SPNG 2 UNDPD 1 OVERWRAP

## (undated) DEVICE — TRAY CATH 16F DRN BG LTX -- CONVERT TO ITEM 363158

## (undated) DEVICE — CYSTO/BLADDER IRRIGATION SET, REGULATING CLAMP

## (undated) DEVICE — [HIGH FLOW INSUFFLATOR,  DO NOT USE IF PACKAGE IS DAMAGED,  KEEP DRY,  KEEP AWAY FROM SUNLIGHT,  PROTECT FROM HEAT AND RADIOACTIVE SOURCES.]: Brand: PNEUMOSURE